# Patient Record
Sex: MALE | Race: BLACK OR AFRICAN AMERICAN | Employment: FULL TIME | URBAN - METROPOLITAN AREA
[De-identification: names, ages, dates, MRNs, and addresses within clinical notes are randomized per-mention and may not be internally consistent; named-entity substitution may affect disease eponyms.]

---

## 2022-01-16 ENCOUNTER — APPOINTMENT (OUTPATIENT)
Dept: GENERAL RADIOLOGY | Age: 47
End: 2022-01-16
Attending: EMERGENCY MEDICINE
Payer: COMMERCIAL

## 2022-01-16 ENCOUNTER — HOSPITAL ENCOUNTER (EMERGENCY)
Age: 47
Discharge: HOME | End: 2022-01-16
Attending: EMERGENCY MEDICINE
Payer: COMMERCIAL

## 2022-01-16 VITALS
RESPIRATION RATE: 18 BRPM | OXYGEN SATURATION: 100 % | WEIGHT: 195 LBS | DIASTOLIC BLOOD PRESSURE: 96 MMHG | HEIGHT: 72 IN | TEMPERATURE: 97.7 F | BODY MASS INDEX: 26.41 KG/M2 | SYSTOLIC BLOOD PRESSURE: 152 MMHG | HEART RATE: 88 BPM

## 2022-01-16 DIAGNOSIS — R06.02 SHORTNESS OF BREATH: Primary | ICD-10-CM

## 2022-01-16 DIAGNOSIS — Z20.822 PERSON UNDER INVESTIGATION FOR COVID-19: ICD-10-CM

## 2022-01-16 DIAGNOSIS — R73.9 HYPERGLYCEMIA: ICD-10-CM

## 2022-01-16 LAB
ALBUMIN SERPL-MCNC: 3.1 G/DL (ref 3.4–5)
ALBUMIN/GLOB SERPL: 0.8 {RATIO} (ref 0.8–1.7)
ALP SERPL-CCNC: 101 U/L (ref 45–117)
ALT SERPL-CCNC: 22 U/L (ref 16–61)
ANION GAP SERPL CALC-SCNC: 12 MMOL/L (ref 3–18)
APTT PPP: 23.8 SEC (ref 23–36.4)
AST SERPL-CCNC: 12 U/L (ref 10–38)
ATRIAL RATE: 96 BPM
BASE DEFICIT BLDV-SCNC: 2.3 MMOL/L
BASOPHILS # BLD: 0 K/UL (ref 0–0.1)
BASOPHILS NFR BLD: 0 % (ref 0–2)
BDY SITE: ABNORMAL
BILIRUB SERPL-MCNC: 0.8 MG/DL (ref 0.2–1)
BUN SERPL-MCNC: 10 MG/DL (ref 7–18)
BUN/CREAT SERPL: 8 (ref 12–20)
CALCIUM SERPL-MCNC: 8.6 MG/DL (ref 8.5–10.1)
CALCULATED P AXIS, ECG09: 66 DEGREES
CALCULATED R AXIS, ECG10: 56 DEGREES
CALCULATED T AXIS, ECG11: 49 DEGREES
CHLORIDE SERPL-SCNC: 93 MMOL/L (ref 100–111)
CO2 SERPL-SCNC: 24 MMOL/L (ref 21–32)
CREAT SERPL-MCNC: 1.31 MG/DL (ref 0.6–1.3)
D DIMER PPP FEU-MCNC: 0.34 UG/ML(FEU)
DIAGNOSIS, 93000: NORMAL
DIFFERENTIAL METHOD BLD: ABNORMAL
EOSINOPHIL # BLD: 0 K/UL (ref 0–0.4)
EOSINOPHIL NFR BLD: 1 % (ref 0–5)
ERYTHROCYTE [DISTWIDTH] IN BLOOD BY AUTOMATED COUNT: 11.6 % (ref 11.6–14.5)
GAS FLOW.O2 O2 DELIVERY SYS: ABNORMAL L/MIN
GLOBULIN SER CALC-MCNC: 3.7 G/DL (ref 2–4)
GLUCOSE BLD STRIP.AUTO-MCNC: 238 MG/DL (ref 70–110)
GLUCOSE SERPL-MCNC: 719 MG/DL (ref 74–99)
HCO3 BLDV-SCNC: 22.6 MMOL/L (ref 23–28)
HCT VFR BLD AUTO: 48.2 % (ref 36–48)
HGB BLD-MCNC: 15.6 G/DL (ref 13–16)
IMM GRANULOCYTES # BLD AUTO: 0 K/UL (ref 0–0.04)
IMM GRANULOCYTES NFR BLD AUTO: 1 % (ref 0–0.5)
INR PPP: 0.9 (ref 0.8–1.2)
LYMPHOCYTES # BLD: 1.1 K/UL (ref 0.9–3.6)
LYMPHOCYTES NFR BLD: 33 % (ref 21–52)
MAGNESIUM SERPL-MCNC: 1.9 MG/DL (ref 1.6–2.6)
MCH RBC QN AUTO: 30.1 PG (ref 24–34)
MCHC RBC AUTO-ENTMCNC: 32.4 G/DL (ref 31–37)
MCV RBC AUTO: 92.9 FL (ref 78–100)
MONOCYTES # BLD: 0.4 K/UL (ref 0.05–1.2)
MONOCYTES NFR BLD: 10 % (ref 3–10)
NEUTS SEG # BLD: 1.9 K/UL (ref 1.8–8)
NEUTS SEG NFR BLD: 55 % (ref 40–73)
NRBC # BLD: 0 K/UL (ref 0–0.01)
NRBC BLD-RTO: 0 PER 100 WBC
P-R INTERVAL, ECG05: 140 MS
PCO2 BLDV: 38.3 MMHG (ref 41–51)
PH BLDV: 7.38 [PH] (ref 7.32–7.42)
PLATELET # BLD AUTO: 234 K/UL (ref 135–420)
PMV BLD AUTO: 9.3 FL (ref 9.2–11.8)
PO2 BLDV: 87 MMHG (ref 25–40)
POTASSIUM SERPL-SCNC: 4.6 MMOL/L (ref 3.5–5.5)
PROT SERPL-MCNC: 6.8 G/DL (ref 6.4–8.2)
PROTHROMBIN TIME: 12 SEC (ref 11.5–15.2)
Q-T INTERVAL, ECG07: 374 MS
QRS DURATION, ECG06: 92 MS
QTC CALCULATION (BEZET), ECG08: 472 MS
RBC # BLD AUTO: 5.19 M/UL (ref 4.35–5.65)
SAO2 % BLDV: 96.5 % (ref 65–88)
SARS-COV-2, COV2: NORMAL
SERVICE CMNT-IMP: ABNORMAL
SODIUM SERPL-SCNC: 129 MMOL/L (ref 136–145)
SPECIMEN TYPE: ABNORMAL
TROPONIN-HIGH SENSITIVITY: 15 NG/L (ref 0–78)
VENTRICULAR RATE, ECG03: 96 BPM
WBC # BLD AUTO: 3.5 K/UL (ref 4.6–13.2)

## 2022-01-16 PROCEDURE — 85379 FIBRIN DEGRADATION QUANT: CPT

## 2022-01-16 PROCEDURE — 80053 COMPREHEN METABOLIC PANEL: CPT

## 2022-01-16 PROCEDURE — 85730 THROMBOPLASTIN TIME PARTIAL: CPT

## 2022-01-16 PROCEDURE — 96360 HYDRATION IV INFUSION INIT: CPT

## 2022-01-16 PROCEDURE — 74011636637 HC RX REV CODE- 636/637: Performed by: EMERGENCY MEDICINE

## 2022-01-16 PROCEDURE — 84484 ASSAY OF TROPONIN QUANT: CPT

## 2022-01-16 PROCEDURE — 85025 COMPLETE CBC W/AUTO DIFF WBC: CPT

## 2022-01-16 PROCEDURE — 99284 EMERGENCY DEPT VISIT MOD MDM: CPT

## 2022-01-16 PROCEDURE — 82803 BLOOD GASES ANY COMBINATION: CPT

## 2022-01-16 PROCEDURE — 71045 X-RAY EXAM CHEST 1 VIEW: CPT

## 2022-01-16 PROCEDURE — 96376 TX/PRO/DX INJ SAME DRUG ADON: CPT

## 2022-01-16 PROCEDURE — 82962 GLUCOSE BLOOD TEST: CPT

## 2022-01-16 PROCEDURE — 74011250636 HC RX REV CODE- 250/636: Performed by: EMERGENCY MEDICINE

## 2022-01-16 PROCEDURE — U0003 INFECTIOUS AGENT DETECTION BY NUCLEIC ACID (DNA OR RNA); SEVERE ACUTE RESPIRATORY SYNDROME CORONAVIRUS 2 (SARS-COV-2) (CORONAVIRUS DISEASE [COVID-19]), AMPLIFIED PROBE TECHNIQUE, MAKING USE OF HIGH THROUGHPUT TECHNOLOGIES AS DESCRIBED BY CMS-2020-01-R: HCPCS

## 2022-01-16 PROCEDURE — 93005 ELECTROCARDIOGRAM TRACING: CPT

## 2022-01-16 PROCEDURE — 96361 HYDRATE IV INFUSION ADD-ON: CPT

## 2022-01-16 PROCEDURE — 83735 ASSAY OF MAGNESIUM: CPT

## 2022-01-16 PROCEDURE — 85610 PROTHROMBIN TIME: CPT

## 2022-01-16 PROCEDURE — 96374 THER/PROPH/DIAG INJ IV PUSH: CPT

## 2022-01-16 RX ORDER — METFORMIN HYDROCHLORIDE 1000 MG/1
1000 TABLET ORAL 2 TIMES DAILY WITH MEALS
Qty: 60 TABLET | Refills: 0 | Status: SHIPPED | OUTPATIENT
Start: 2022-01-16

## 2022-01-16 RX ORDER — BENZONATATE 100 MG/1
100 CAPSULE ORAL
Qty: 30 CAPSULE | Refills: 0 | Status: SHIPPED | OUTPATIENT
Start: 2022-01-16 | End: 2022-01-26

## 2022-01-16 RX ORDER — BLOOD SUGAR DIAGNOSTIC
STRIP MISCELLANEOUS
Qty: 100 STRIP | Refills: 0 | Status: SHIPPED | OUTPATIENT
Start: 2022-01-16 | End: 2022-01-16 | Stop reason: SDUPTHER

## 2022-01-16 RX ORDER — BLOOD SUGAR DIAGNOSTIC
STRIP MISCELLANEOUS
Qty: 100 STRIP | Refills: 0 | Status: SHIPPED | OUTPATIENT
Start: 2022-01-16

## 2022-01-16 RX ORDER — METFORMIN HYDROCHLORIDE 1000 MG/1
1000 TABLET ORAL 2 TIMES DAILY WITH MEALS
Qty: 60 TABLET | Refills: 0 | Status: SHIPPED | OUTPATIENT
Start: 2022-01-16 | End: 2022-01-16 | Stop reason: SDUPTHER

## 2022-01-16 RX ORDER — BENZONATATE 100 MG/1
100 CAPSULE ORAL
Qty: 30 CAPSULE | Refills: 0 | Status: SHIPPED | OUTPATIENT
Start: 2022-01-16 | End: 2022-01-16 | Stop reason: SDUPTHER

## 2022-01-16 RX ADMIN — INSULIN HUMAN 15 UNITS: 100 INJECTION, SOLUTION PARENTERAL at 14:03

## 2022-01-16 RX ADMIN — SODIUM CHLORIDE 1000 ML: 9 INJECTION, SOLUTION INTRAVENOUS at 13:11

## 2022-01-16 RX ADMIN — INSULIN HUMAN 15 UNITS: 100 INJECTION, SOLUTION PARENTERAL at 14:54

## 2022-01-16 NOTE — Clinical Note
[facility] [place]  [facility]  [address]  [facility] [address] [phone]    Work/School Note    Date: 1/16/2022    To Whom It May concern:      Ino Leos was seen and treated today in the emergency room by the following provider(s):  Attending Provider: Mati Bal MD.      Ino Leos is excused from work/school on 01/16/22. He is clear to return to work/school on 01/17/22.         Sincerely,          Imelda Banerjee MD

## 2022-01-16 NOTE — Clinical Note
[facility] [place]  [facility] EMERGENCY DEPT  [address]  [facility] NEWS [address]  [address] [phone]    Work/School Note    Date: 1/16/2022     To Whom It May concern:    Ayaka Mcdaniel was evaluated by the following provider(s):  Attending Provider: Leopold Searle, MD.   [address] virus is suspected. Per the CDC guidelines we recommend home isolation until the following conditions are all met:    1. At least five days have passed since symptoms first appeared and/or had a close exposure,   2. After home isolation for five days, wearing a mask around others for the next five days,  3. At least 24 have passed since last fever without the use of fever-reducing medications and  4.  Symptoms (eg cough, shortness of breath) have improved      Sincerely,          Imelda Gar MD

## 2022-01-16 NOTE — ED PROVIDER NOTES
EMERGENCY DEPARTMENT HISTORY AND PHYSICAL EXAM    Date: 1/16/2022  Patient Name: Jessie Magaña    History of Presenting Illness     Chief Complaint   Patient presents with    Cough       History Provided By: Patient     History Vishnu Muñoz):   12:19 PM  Jessie Magaña is a 42 y.o. male with no contributory PMHX who presents to the emergency department C/O dyspnea onset 4 days. Associated sxs include cough, fatigue. Pt denies any other sxs or complaints. Patient is fully vaccinated for COVID with 2 doses of Moderna. He visited his friend in [place] last week. He suspects he got COVID from his friend. He has been self quarantining at home. Chief Complaint: dyspnea  Onset: 4 days   Timing:  acute  Context: Exposure to COVID brought symptoms on, symptoms have rapidly worsened since onset  Location: lungs  Quality: Aching  Severity: Moderate  Modifying Factors: nothing makes it better, or worse. Associated Symptoms: cough, fatigue    PCP: None    Past History         Past Medical History:  History reviewed. No pertinent past medical history. Past Surgical History:  History reviewed. No pertinent surgical history. Family History:  History reviewed. No pertinent family history. Reviewed and non-contributory    Social History:  Social History     Tobacco Use    Smoking status: Not on file    Smokeless tobacco: Not on file   Substance Use Topics    Alcohol use: Not on file    Drug use: Not on file       Allergies:  No Known Allergies      Review of Systems      Review of Systems   Constitutional: Positive for fatigue. Negative for chills and fever. HENT: Negative for rhinorrhea and sore throat. Eyes: Negative for pain and visual disturbance. Respiratory: Positive for cough and shortness of breath. Negative for chest tightness and wheezing. Cardiovascular: Negative for chest pain and palpitations. Gastrointestinal: Negative for abdominal pain, diarrhea, nausea and vomiting.    Musculoskeletal: Negative for arthralgias and myalgias. Skin: Negative for rash and wound. Neurological: Negative for speech difficulty, light-headedness and headaches. Psychiatric/Behavioral: Negative for agitation and confusion. All other systems reviewed and are negative. Physical Exam     Vitals:    01/16/22 1208   BP: (!) 145/71   Pulse: 97   Resp: 18   Temp: 97.7 °F (36.5 °C)   SpO2: 100%   Weight: 88.5 kg (195 lb)   Height: 6' (1.829 m)       Physical Exam  Vitals and nursing note reviewed. Constitutional:       General: He is not in acute distress. Appearance: Normal appearance. He is normal weight. He is not ill-appearing. HENT:      Head: Normocephalic and atraumatic. Nose: Nose normal. No rhinorrhea. Mouth/Throat:      Mouth: Mucous membranes are moist.      Pharynx: No oropharyngeal exudate or posterior oropharyngeal erythema. Eyes:      Extraocular Movements: Extraocular movements intact. Conjunctiva/sclera: Conjunctivae normal.      Pupils: Pupils are equal, round, and reactive to light. Cardiovascular:      Rate and Rhythm: Normal rate and regular rhythm. Heart sounds: No murmur heard. No friction rub. No gallop. Pulmonary:      Effort: Pulmonary effort is normal. No respiratory distress. Breath sounds: Normal breath sounds. No wheezing, rhonchi or rales. Abdominal:      General: Bowel sounds are normal.      Palpations: Abdomen is soft. Tenderness: There is no abdominal tenderness. There is no guarding or rebound. Musculoskeletal:         General: No swelling, tenderness or deformity. Normal range of motion. Cervical back: Normal range of motion and neck supple. No rigidity. Lymphadenopathy:      Cervical: No cervical adenopathy. Skin:     General: Skin is warm and dry. Findings: No rash. Neurological:      General: No focal deficit present. Mental Status: He is alert and oriented to person, place, and time.    Psychiatric: Mood and Affect: Mood normal.         Behavior: Behavior normal.         Diagnostic Study Results     Labs -     Recent Results (from the past 12 hour(s))   CBC WITH AUTOMATED DIFF    Collection Time: 01/16/22 12:30 PM   Result Value Ref Range    WBC 3.5 (L) 4.6 - 13.2 K/uL    RBC 5.19 4.35 - 5.65 M/uL    HGB 15.6 13.0 - 16.0 g/dL    HCT 48.2 (H) 36.0 - 48.0 %    MCV 92.9 78.0 - 100.0 FL    MCH 30.1 24.0 - 34.0 PG    MCHC 32.4 31.0 - 37.0 g/dL    RDW 11.6 11.6 - 14.5 %    PLATELET 498 965 - 840 K/uL    MPV 9.3 9.2 - 11.8 FL    NRBC 0.0 0  WBC    ABSOLUTE NRBC 0.00 0.00 - 0.01 K/uL    NEUTROPHILS 55 40 - 73 %    LYMPHOCYTES 33 21 - 52 %    MONOCYTES 10 3 - 10 %    EOSINOPHILS 1 0 - 5 %    BASOPHILS 0 0 - 2 %    IMMATURE GRANULOCYTES 1 (H) 0.0 - 0.5 %    ABS. NEUTROPHILS 1.9 1.8 - 8.0 K/UL    ABS. LYMPHOCYTES 1.1 0.9 - 3.6 K/UL    ABS. MONOCYTES 0.4 0.05 - 1.2 K/UL    ABS. EOSINOPHILS 0.0 0.0 - 0.4 K/UL    ABS. BASOPHILS 0.0 0.0 - 0.1 K/UL    ABS. IMM.  GRANS. 0.0 0.00 - 0.04 K/UL    DF AUTOMATED     D DIMER    Collection Time: 01/16/22 12:30 PM   Result Value Ref Range    D DIMER 0.34 <0.46 ug/ml(FEU)   PROTHROMBIN TIME + INR    Collection Time: 01/16/22 12:30 PM   Result Value Ref Range    Prothrombin time 12.0 11.5 - 15.2 sec    INR 0.9 0.8 - 1.2     PTT    Collection Time: 01/16/22 12:30 PM   Result Value Ref Range    aPTT 23.8 23.0 - 36.4 SEC   EKG, 12 LEAD, INITIAL    Collection Time: 01/16/22 12:38 PM   Result Value Ref Range    Ventricular Rate 96 BPM    Atrial Rate 96 BPM    P-R Interval 140 ms    QRS Duration 92 ms    Q-T Interval 374 ms    QTC Calculation (Bezet) 472 ms    Calculated P Axis 66 degrees    Calculated R Axis 56 degrees    Calculated T Axis 49 degrees    Diagnosis       Normal sinus rhythm  Possible Left atrial enlargement  Septal infarct , age undetermined  Abnormal ECG  No previous ECGs available     SARS-COV-2    Collection Time: 01/16/22 12:45 PM   Result Value Ref Range    SARS-CoV-2 Please find results under separate order     TROPONIN-HIGH SENSITIVITY    Collection Time: 01/16/22  1:15 PM   Result Value Ref Range    Troponin-High Sensitivity 15 0 - 78 ng/L   MAGNESIUM    Collection Time: 01/16/22  1:15 PM   Result Value Ref Range    Magnesium 1.9 1.6 - 2.6 mg/dL   METABOLIC PANEL, COMPREHENSIVE    Collection Time: 01/16/22  1:15 PM   Result Value Ref Range    Sodium 129 (L) 136 - 145 mmol/L    Potassium 4.6 3.5 - 5.5 mmol/L    Chloride 93 (L) 100 - 111 mmol/L    CO2 24 21 - 32 mmol/L    Anion gap 12 3.0 - 18 mmol/L    Glucose 719 (HH) 74 - 99 mg/dL    BUN 10 7.0 - 18 MG/DL    Creatinine 1.31 (H) 0.6 - 1.3 MG/DL    BUN/Creatinine ratio 8 (L) 12 - 20      GFR est AA >60 >60 ml/min/1.73m2    GFR est non-AA 59 (L) >60 ml/min/1.73m2    Calcium 8.6 8.5 - 10.1 MG/DL    Bilirubin, total 0.8 0.2 - 1.0 MG/DL    ALT (SGPT) 22 16 - 61 U/L    AST (SGOT) 12 10 - 38 U/L    Alk. phosphatase 101 45 - 117 U/L    Protein, total 6.8 6.4 - 8.2 g/dL    Albumin 3.1 (L) 3.4 - 5.0 g/dL    Globulin 3.7 2.0 - 4.0 g/dL    A-G Ratio 0.8 0.8 - 1.7     POC VENOUS BLOOD GAS    Collection Time: 01/16/22  2:35 PM   Result Value Ref Range    Device: ROOM AIR      pH, venous (POC) 7.38 7.32 - 7.42      pCO2, venous (POC) 38.3 (L) 41 - 51 MMHG    pO2, venous (POC) 87 (H) 25 - 40 mmHg    HCO3, venous (POC) 22.6 (L) 23.0 - 28.0 MMOL/L    sO2, venous (POC) 96.5 (H) 65 - 88 %    Base deficit, venous (POC) 2.3 mmol/L    Site LEFT RADIAL      Specimen type (POC) VENOUS BLOOD      Performed by Mandeep Garcias    GLUCOSE, POC    Collection Time: 01/16/22  3:25 PM   Result Value Ref Range    Glucose (POC) 238 (H) 70 - 110 mg/dL       Radiologic Studies -   XR CHEST PORT   Final Result      No acute cardiopulmonary process. CT Results  (Last 48 hours)    None        CXR Results  (Last 48 hours)               01/16/22 1309  XR CHEST PORT Final result    Impression:      No acute cardiopulmonary process.        Narrative:  CLINICAL HISTORY:  As above. COMPARISONS:  None. TECHNIQUE:  single frontal view of the chest       ------------------------------------------       FINDINGS:       Lungs:  No consolidation or pleural fluid. Mediastinum: Unremarkable. Bones: No evidence of fracture or suspicious bone lesion.           ------------------------------------------             Medications given in the ED-  Medications   sodium chloride 0.9 % bolus infusion 1,000 mL (0 mL IntraVENous IV Completed 1/16/22 1519)   insulin regular (NOVOLIN R, HUMULIN R) injection 15 Units (15 Units IntraVENous Given 1/16/22 1403)   insulin regular (NOVOLIN R, HUMULIN R) injection 15 Units (15 Units IntraVENous Given 1/16/22 1454)         Procedures     Procedures    ED Course     I am the first provider for this patient. I reviewed the vital signs, available nursing notes, past medical history, past surgical history, family history and social history. Records Reviewed: Nursing Notes    Pulse Oximetry Analysis - 100% on RA     Cardiac Monitor:  Rate: 97 bpm  Rhythm: sinus rhythm    EKG interpretation: (Preliminary)  Rhythm: NSR. Rate: 96 bpm; no STEMI  EKG read by Fausto Winter MD at 12:38 PM    12:19 PM Initial assessment performed. The patients presenting problems have been discussed, and they are in agreement with the care plan formulated and outlined with them. I have encouraged them to ask questions as they arise throughout their visit. ED Course as of 01/16/22 1544   Sun Jan 16, 2022   1400 Patient states that he does have a history of diabetes but he is not currently taking his metformin. He does not like the side effects. [JM]   1439 Repeat blood sugar still elevated, will redose insulin. [JM]   1538 Sugar below 300.  [JM]      ED Course User Index  [JM] Luis Rico MD           Medical Decision Making     Provider Notes (Medical Decision Making):   DDX: URI, pneumonia, coronavirus, hyperglycemia    Discussion:  42 y.o. male presented with concerns for coronavirus infection, however his blood sugar was over 700. Patient states he is not taking his metformin at home. He did have a slight anion gap but did not show any acidosis on ABG. Patient's sugar was corrected rapidly in the ED to below 300. He will be restarted on his metformin at home and his test strips were refilled. He should follow-up with his primary care doctor or 1 of ours. Patient understands and agrees with this plan. Diagnosis and Disposition     DISCHARGE NOTE:  3:39 PM   Papo Franz  results have been reviewed with him. He has been counseled regarding his diagnosis, treatment, and plan. He verbally conveys understanding and agreement of the signs, symptoms, diagnosis, treatment and prognosis and additionally agrees to follow up as discussed. He also agrees with the care-plan and conveys that all of his questions have been answered. I have also provided discharge instructions for him that include: educational information regarding their diagnosis and treatment, and list of reasons why they would want to return to the ED prior to their follow-up appointment, should his condition change. He has been provided with education for proper emergency department utilization. CLINICAL IMPRESSION:    1. Shortness of breath    2. Person under investigation for COVID-19    3. Hyperglycemia        PLAN:  1. D/C Home  2. Current Discharge Medication List      START taking these medications    Details   metFORMIN (GLUCOPHAGE) 1,000 mg tablet Take 1 Tablet by mouth two (2) times daily (with meals). Qty: 60 Tablet, Refills: 0  Start date: 1/16/2022      glucose blood VI test strips (OneTouch Ultra Test) strip Check sugars daily with meals and before bed. Qty: 100 Strip, Refills: 0  Start date: 1/16/2022      benzonatate (Tessalon Perles) 100 mg capsule Take 1 Capsule by mouth three (3) times daily as needed for Cough for up to 10 days.   Qty: 30 Capsule, Refills: 0  Start date: 1/16/2022, End date: 1/26/2022           3. Follow-up Information     Follow up With Specialties Details Why Contact Info    [facility], [facility]. FAMILY MEDICINE  Schedule an appointment as soon as possible for a visit  As soon as possible, For follow up from Emergency Department visit. [facility] [address]  [address] [facility]  Schedule an appointment as soon as possible for a visit  As soon as possible, For follow up from Emergency Department visit. [facility]   As soon as possible, For follow up from Emergency Department visit. [address] [facility] [address] [facility]  Schedule an appointment as soon as possible for a visit  As soon as possible, For follow up from Emergency Department visit. [address] [facility]    THE [facility] EMERGENCY DEPT Emergency Medicine  As needed; If symptoms worsen [address] [address] [address]     Please note that this dictation was completed with [employer], the computer voice recognition software. Quite often unanticipated grammatical, syntax, homophones, and other interpretive errors are inadvertently transcribed by the computer software. Please disregard these errors. Please excuse any errors that have escaped final proofreading.     Alvina Moreno MD

## 2022-01-17 ENCOUNTER — PATIENT OUTREACH (OUTPATIENT)
Dept: CASE MANAGEMENT | Age: 47
End: 2022-01-17

## 2022-01-17 LAB — SARS-COV-2, NAA: DETECTED

## 2022-01-17 RX ORDER — BLOOD SUGAR DIAGNOSTIC
STRIP MISCELLANEOUS
Qty: 100 STRIP | Refills: 0 | Status: SHIPPED | OUTPATIENT
Start: 2022-01-17

## 2022-01-17 NOTE — PROGRESS NOTES
Ambulatory Care Coordination ED COVID Follow up Call    Challenges to be reviewed by the provider   Additional needs identified to be addressed with provider no  none           Encounter was not routed to provider for escalation. Method of communication with provider : none    Discussed COVID-19 related testing which was pending at this time. Test results were pending. Patient informed of results, if available? n/a. Current Symptoms: fever, fatigue and cough    Reviewed New or Changed Meds: yes    Do you have what you need at home?  Durable Medical Equipment ordered at discharge: [facility]/Outpatient orders at discharge: none    Pulse oximeter? no Discussed and confirmed pulse oximeter discharge instructions and when to notify provider or seek emergency care. Patient education provided: Reviewed appropriate site of care based on symptoms and resources available to patient including: PCP. Follow up appointment recommended: yes. If no appointment scheduled, scheduling offered: Patient states he will call PCP to schedule. ACM provided patient with a list of [facility] providers via email. .  No future appointments. Interventions: Obtained and reviewed discharge summary and/or continuity of care documents and Assessment and support for treatment adherence and medication management-. Reviewed discharge instructions, medical action plan and red flags with patient who verbalized understanding. Provided contact information for future needs. Plan for follow-up call in 7-10 days based on severity of symptoms and risk factors.   Plan for next call: self management-.     Kika Perkins

## 2022-01-31 ENCOUNTER — PATIENT OUTREACH (OUTPATIENT)
Dept: CASE MANAGEMENT | Age: 47
End: 2022-01-31

## 2022-01-31 NOTE — PROGRESS NOTES
Patient resolved from [address] Transitions episode on 1/31/22. Discussed COVID-19 related testing which was available at this time. Test results were positive. Patient informed of results, if available? yes     Patient/family has been provided the following resources and education related to COVID-19:                         Signs, symptoms and red flags related to COVID-19            [facility] exposure and quarantine guidelines            [facility] exposure contact - [phone]            Contact for their local Department of Health                 Patient currently reports that the following symptoms have improved:  no new symptoms and no worsening symptoms. No further outreach scheduled with this CTN/ACM/LPN/HC/ MA. Episode of Care resolved. Patient has this CTN/ACM/LPN/HC/MA contact information if future needs arise.

## 2023-05-25 ENCOUNTER — HOSPITAL ENCOUNTER (EMERGENCY)
Facility: HOSPITAL | Age: 48
Discharge: HOME | End: 2023-05-26
Attending: EMERGENCY MEDICINE
Payer: COMMERCIAL

## 2023-05-25 VITALS
BODY MASS INDEX: 23.7 KG/M2 | OXYGEN SATURATION: 100 % | TEMPERATURE: 97.4 F | HEART RATE: 79 BPM | DIASTOLIC BLOOD PRESSURE: 73 MMHG | SYSTOLIC BLOOD PRESSURE: 119 MMHG | RESPIRATION RATE: 18 BRPM | WEIGHT: 175 LBS | HEIGHT: 72 IN

## 2023-05-25 DIAGNOSIS — E11.65 TYPE 2 DIABETES MELLITUS WITH HYPERGLYCEMIA, WITHOUT LONG-TERM CURRENT USE OF INSULIN (HCC): Primary | ICD-10-CM

## 2023-05-25 LAB
ALBUMIN SERPL-MCNC: 3.8 G/DL (ref 3.4–5)
ALBUMIN/GLOB SERPL: 1.1 (ref 0.8–1.7)
ALP SERPL-CCNC: 75 U/L (ref 45–117)
ALT SERPL-CCNC: 16 U/L (ref 16–61)
ANION GAP SERPL CALC-SCNC: 6 MMOL/L (ref 3–18)
APPEARANCE UR: CLEAR
AST SERPL-CCNC: 8 U/L (ref 10–38)
BASOPHILS # BLD: 0 K/UL (ref 0–0.1)
BASOPHILS NFR BLD: 0 % (ref 0–2)
BILIRUB SERPL-MCNC: 0.8 MG/DL (ref 0.2–1)
BILIRUB UR QL: NEGATIVE
BUN SERPL-MCNC: 13 MG/DL (ref 7–18)
BUN/CREAT SERPL: 11 (ref 12–20)
CALCIUM SERPL-MCNC: 9.1 MG/DL (ref 8.5–10.1)
CHLORIDE SERPL-SCNC: 97 MMOL/L (ref 100–111)
CO2 SERPL-SCNC: 29 MMOL/L (ref 21–32)
COLOR UR: YELLOW
CREAT SERPL-MCNC: 1.16 MG/DL (ref 0.6–1.3)
DIFFERENTIAL METHOD BLD: ABNORMAL
EKG ATRIAL RATE: 85 BPM
EKG DIAGNOSIS: NORMAL
EKG P AXIS: 51 DEGREES
EKG P-R INTERVAL: 148 MS
EKG Q-T INTERVAL: 398 MS
EKG QRS DURATION: 102 MS
EKG QTC CALCULATION (BAZETT): 473 MS
EKG R AXIS: 93 DEGREES
EKG T AXIS: -3 DEGREES
EKG VENTRICULAR RATE: 85 BPM
EOSINOPHIL # BLD: 0.1 K/UL (ref 0–0.4)
EOSINOPHIL NFR BLD: 2 % (ref 0–5)
ERYTHROCYTE [DISTWIDTH] IN BLOOD BY AUTOMATED COUNT: 11.7 % (ref 11.6–14.5)
GLOBULIN SER CALC-MCNC: 3.6 G/DL (ref 2–4)
GLUCOSE BLD STRIP.AUTO-MCNC: 439 MG/DL (ref 70–110)
GLUCOSE BLD STRIP.AUTO-MCNC: 446 MG/DL (ref 70–110)
GLUCOSE SERPL-MCNC: 561 MG/DL (ref 74–99)
GLUCOSE UR STRIP.AUTO-MCNC: >1000 MG/DL
HCT VFR BLD AUTO: 40.7 % (ref 36–48)
HGB BLD-MCNC: 13.6 G/DL (ref 13–16)
HGB UR QL STRIP: NEGATIVE
IMM GRANULOCYTES # BLD AUTO: 0 K/UL (ref 0–0.04)
IMM GRANULOCYTES NFR BLD AUTO: 0 % (ref 0–0.5)
KETONES UR QL STRIP.AUTO: 15 MG/DL
LEUKOCYTE ESTERASE UR QL STRIP.AUTO: NEGATIVE
LYMPHOCYTES # BLD: 2.1 K/UL (ref 0.9–3.6)
LYMPHOCYTES NFR BLD: 36 % (ref 21–52)
MCH RBC QN AUTO: 31.6 PG (ref 24–34)
MCHC RBC AUTO-ENTMCNC: 33.4 G/DL (ref 31–37)
MCV RBC AUTO: 94.7 FL (ref 78–100)
MONOCYTES # BLD: 0.4 K/UL (ref 0.05–1.2)
MONOCYTES NFR BLD: 7 % (ref 3–10)
NEUTS SEG # BLD: 3.2 K/UL (ref 1.8–8)
NEUTS SEG NFR BLD: 56 % (ref 40–73)
NITRITE UR QL STRIP.AUTO: NEGATIVE
NRBC # BLD: 0 K/UL (ref 0–0.01)
NRBC BLD-RTO: 0 PER 100 WBC
NT PRO BNP: 13 PG/ML (ref 0–450)
PH UR STRIP: 6.5 (ref 5–8)
PLATELET # BLD AUTO: 290 K/UL (ref 135–420)
PMV BLD AUTO: 9.4 FL (ref 9.2–11.8)
POTASSIUM SERPL-SCNC: 4.2 MMOL/L (ref 3.5–5.5)
PROT SERPL-MCNC: 7.4 G/DL (ref 6.4–8.2)
PROT UR STRIP-MCNC: NEGATIVE MG/DL
RBC # BLD AUTO: 4.3 M/UL (ref 4.35–5.65)
SODIUM SERPL-SCNC: 132 MMOL/L (ref 136–145)
SP GR UR REFRACTOMETRY: >1.03 (ref 1–1.03)
UROBILINOGEN UR QL STRIP.AUTO: 1 EU/DL (ref 0.2–1)
WBC # BLD AUTO: 5.8 K/UL (ref 4.6–13.2)

## 2023-05-25 PROCEDURE — 93005 ELECTROCARDIOGRAM TRACING: CPT | Performed by: EMERGENCY MEDICINE

## 2023-05-25 PROCEDURE — 80053 COMPREHEN METABOLIC PANEL: CPT

## 2023-05-25 PROCEDURE — 81003 URINALYSIS AUTO W/O SCOPE: CPT

## 2023-05-25 PROCEDURE — 96374 THER/PROPH/DIAG INJ IV PUSH: CPT

## 2023-05-25 PROCEDURE — 96361 HYDRATE IV INFUSION ADD-ON: CPT

## 2023-05-25 PROCEDURE — 6370000000 HC RX 637 (ALT 250 FOR IP): Performed by: EMERGENCY MEDICINE

## 2023-05-25 PROCEDURE — 99284 EMERGENCY DEPT VISIT MOD MDM: CPT

## 2023-05-25 PROCEDURE — 2580000003 HC RX 258: Performed by: EMERGENCY MEDICINE

## 2023-05-25 PROCEDURE — 83880 ASSAY OF NATRIURETIC PEPTIDE: CPT

## 2023-05-25 PROCEDURE — 96376 TX/PRO/DX INJ SAME DRUG ADON: CPT

## 2023-05-25 PROCEDURE — 85025 COMPLETE CBC W/AUTO DIFF WBC: CPT

## 2023-05-25 PROCEDURE — 82962 GLUCOSE BLOOD TEST: CPT

## 2023-05-25 RX ORDER — 0.9 % SODIUM CHLORIDE 0.9 %
1000 INTRAVENOUS SOLUTION INTRAVENOUS ONCE
Status: COMPLETED | OUTPATIENT
Start: 2023-05-25 | End: 2023-05-26

## 2023-05-25 RX ADMIN — SODIUM CHLORIDE 1000 ML: 9 INJECTION, SOLUTION INTRAVENOUS at 22:39

## 2023-05-25 RX ADMIN — INSULIN HUMAN 10 UNITS: 100 INJECTION, SOLUTION PARENTERAL at 22:40

## 2023-05-25 ASSESSMENT — PAIN - FUNCTIONAL ASSESSMENT: PAIN_FUNCTIONAL_ASSESSMENT: NONE - DENIES PAIN

## 2023-05-26 LAB — GLUCOSE BLD STRIP.AUTO-MCNC: 259 MG/DL (ref 70–110)

## 2023-05-26 PROCEDURE — 2580000003 HC RX 258: Performed by: EMERGENCY MEDICINE

## 2023-05-26 PROCEDURE — 82962 GLUCOSE BLOOD TEST: CPT

## 2023-05-26 PROCEDURE — 6370000000 HC RX 637 (ALT 250 FOR IP): Performed by: EMERGENCY MEDICINE

## 2023-05-26 RX ORDER — 0.9 % SODIUM CHLORIDE 0.9 %
1000 INTRAVENOUS SOLUTION INTRAVENOUS ONCE
Status: COMPLETED | OUTPATIENT
Start: 2023-05-26 | End: 2023-05-26

## 2023-05-26 RX ADMIN — INSULIN HUMAN 10 UNITS: 100 INJECTION, SOLUTION PARENTERAL at 00:08

## 2023-05-26 RX ADMIN — SODIUM CHLORIDE 1000 ML: 900 INJECTION, SOLUTION INTRAVENOUS at 01:19

## 2023-05-26 NOTE — ED TRIAGE NOTES
Pt arrives to ed reporting high blood sugar (bs 406) that was noticed by dr. Rafiq Blue takes metformin. However states he does not take it daily.  Bs 446 in triage after meal.

## 2023-05-26 NOTE — ED NOTES
Chief Complaint   Patient presents with    Hyperglycemia   No past medical history on file. Pt in gown, in bed locked and in lowest position, call light in reach, family at bedside, warm blanket provided, monitoring BP, HR, and O2. Lined and Labs drawn, urine collected and all sent to lab.      Rosalino Sullivan RN  05/25/23 0432

## 2023-05-26 NOTE — ED PROVIDER NOTES
[facility] EMERGENCY DEPT  EMERGENCY DEPARTMENT ENCOUNTER    Patient Name: Guera Luciano  MRN: [id number]  YOB: 1975  Provider: Mert Lopez MD  PCP: SADIA Mckee NP   Time/Date of evaluation: 9:54 PM EDT on 5/25/23    History of Presenting Illness     Chief Complaint   Patient presents with    Hyperglycemia       History Provided by: Patient and Patient's Wife   History is limited by: Nothing    HISTORY Meera Infante):   Guera Luciano is a 41 y.o. male with a PMHX of diabetes  who presents to the emergency department (room 1) by POV C/O hyperglycemia onset today. Associated sxs include elevated blood glucose at home (406). Pt denies any other sxs or complaints. Patient states he is using metformin, but does not take daily. He typically takes it he feels that he needs it. Glucose is 446 in triage. Nursing Notes were all reviewed and agreed with or any disagreements were addressed in the HPI. Past History     PAST MEDICAL HISTORY:  No past medical history on file. PAST SURGICAL HISTORY:  No past surgical history on file. FAMILY HISTORY:  No family history on file.     SOCIAL HISTORY:       MEDICATIONS:  Current Facility-Administered Medications   Medication Dose Route Frequency Provider Last Rate Last Admin    0.9 % sodium chloride bolus  1,000 mL IntraVENous Once Prakash Yeung MD         Current Outpatient Medications   Medication Sig Dispense Refill    metFORMIN (GLUCOPHAGE) 1000 MG tablet Take 1,000 mg by mouth 2 times daily (with meals)         ALLERGIES:  No Known Allergies    SOCIAL DETERMINANTS OF HEALTH:  Social Determinants of Health     Tobacco Use: Not on file   Alcohol Use: Not on file   Financial Resource Strain: Not on file   Food Insecurity: Not on file   Transportation Needs: Not on file   Physical Activity: Not on file   Stress: Not on file   Social Connections: Not on file   Intimate Partner Violence: Not on file   Depression: Not on file   Housing Stability:

## 2023-05-26 NOTE — DISCHARGE INSTRUCTIONS
Primary care doctor. Return to ED for worsening symptoms or for other concerns. Discussed with your endocrinologist or your primary care doctor possible changes in diabetic medications.

## 2023-05-31 LAB
EKG ATRIAL RATE: 85 BPM
EKG DIAGNOSIS: NORMAL
EKG P AXIS: 51 DEGREES
EKG P-R INTERVAL: 148 MS
EKG Q-T INTERVAL: 398 MS
EKG QRS DURATION: 102 MS
EKG QTC CALCULATION (BAZETT): 473 MS
EKG R AXIS: 93 DEGREES
EKG T AXIS: -3 DEGREES
EKG VENTRICULAR RATE: 85 BPM

## 2024-04-03 ENCOUNTER — OFFICE VISIT (OUTPATIENT)
Age: 49
End: 2024-04-03
Payer: COMMERCIAL

## 2024-04-03 VITALS
WEIGHT: 200 LBS | SYSTOLIC BLOOD PRESSURE: 110 MMHG | HEIGHT: 72 IN | DIASTOLIC BLOOD PRESSURE: 76 MMHG | BODY MASS INDEX: 27.09 KG/M2 | HEART RATE: 98 BPM | OXYGEN SATURATION: 98 %

## 2024-04-03 DIAGNOSIS — I83.813 VARICOSE VEINS OF BOTH LOWER EXTREMITIES WITH PAIN: Primary | ICD-10-CM

## 2024-04-03 PROCEDURE — 99203 OFFICE O/P NEW LOW 30 MIN: CPT | Performed by: SURGERY

## 2024-04-03 RX ORDER — INSULIN GLARGINE-YFGN 100 [IU]/ML
INJECTION, SOLUTION SUBCUTANEOUS
COMMUNITY
Start: 2024-02-21

## 2024-04-03 NOTE — PATIENT INSTRUCTIONS
In preparation for your venous ultrasound (reflux study), please do the following:   Do not wear compression stockings for 4 days prior to the ultrasound  Do not consume caffeine, including coffee, tea, soda or other caffeine containing soft drink, for 24 hours before the ultrasound  Please make sure that you are well hydrated when you present for the ultrasound.     This is a complex and detailed study and will require about 1.5 hours of your time. You need to be able to lie on a bed for the majority of that time.

## 2024-04-03 NOTE — PROGRESS NOTES
Carlos Jiménez    Chief Complaint   Patient presents with    Varicose Veins     Self referred for pain.       History and Physical    Carlos Jiménez is a 48 y.o. male with PMH significant for IDDM.     he presents today for varicose veins and pains    he describes bilateral foot numbness, large varicose veins in both legs which become painful. Varices are tender to palpation and sensitive to touch and burning. Symptoms worse by the end of the day and with more activity.     In addition, he describes pain in his back, lateral hips as well as abdomen. Also endorses shooting pain in his arms at times as well as his chest.     Has been evaluated [facility]  - s/p hip injections bilaterally - didn't relieve symptoms  - was told that he has arthritis in his spine    Has not done physical therapy for his back.     He works as a , 50/50 standing and sitting.     Onset of symptoms was about a year ago and have been worsening.    Associated symptoms:   [x] Edema - has noticed bilateral ankle edema  [x] varicose veins  [x] heaviness/aching  [x] fatigue  [x] Pain  [] H/o or current ulcer(s)    Aggravating factors include standing. Relieving factors include none.     Patient   [x] has   [] has not   been wearing compression stockings. Has been wearing compression stockings for years. He thinks they do not help his symptoms much.       Relevant history:   [] female gender  [] Family history of venous disease: maternal grandmother, mother  [] history of pregnancy: n/a  [] history of DVT/PE  [] history of vein procedure      Pertinent edema history:  [] CHF/pulmonary HTN  [] SONDRA/snoring  [] CKD  [] Pulmonary disease  [] Obesity   [] Activity level    Smoking status: cigars intermittently 2x/week, x 5-10 years.                               No pertinent imaging studies to review    The following labs were reviewed:   Lab Results   Component Value Date     (L) 05/25/2023

## 2024-04-23 ENCOUNTER — HOSPITAL ENCOUNTER (OUTPATIENT)
Facility: HOSPITAL | Age: 49
Setting detail: RECURRING SERIES
Discharge: HOME | End: 2024-04-26
Payer: COMMERCIAL

## 2024-04-23 PROCEDURE — 97162 PT EVAL MOD COMPLEX 30 MIN: CPT

## 2024-04-23 NOTE — PROGRESS NOTES
[facility]  [address] [place] [address]  Ph [phone]  Fx [phone]    Plan of Care/ Statement of Necessity for Physical Therapy Services      Patient name: Carlos Jiménez Start of Care: 2024   Referral source: Rudolph Escobedo MD : 1975    Medical Diagnosis: Other low back pain [M54.59]   Onset Date:23    Treatment Diagnosis: M54.59  OTHER LOWER BACK PAIN     Prior Hospitalization: see medical history Provider#: [id number]   Medications: Verified on Patient summary List   Comorbidities: Dm type 2, vascular insufficiency   Prior Level of Function:  functionally independent, no AD, active  lifestyle       The Plan of Care and following information is based on the information from the initial evaluation.  Assessment/ key information: 47 yo male who [facility] to [facility] with c/o Low back pain. Patient reported that his legs and hips started hurting about a year ago.  Pt initially had pain in his hips which has now progressed to legs.  He states that his pain shoots across his groin abdomen and down his legs. Pt reported that when pain originally started pt tried a steroid pack with mild improvements.  Pt reports that pain is currently worsening in LBP and right side. Patient reports decreased extended standing, difficulty driving, and difficulty with walking. Patient demonstrates decreased ROM, decreased strength, impaired posture, impaired gait mechancis, pain and decreased functional mobility tolerance.     Patient will continue to benefit from skilled PT services to modify and progress therapeutic interventions, address functional mobility deficits, address ROM deficits, address strength deficits, analyze and address soft tissue restrictions, analyze and cue movement patterns, analyze and modify body mechanics/ergonomics, assess and modify postural abnormalities, address imbalance/dizziness, and instruct in home and community integration to attain remaining 
Evaluation - Lumbar Spine  :    OBJECTIVE  Posture:  Lateral Shift: [] Right    [] Left     [] +  [x] -  Kyphosis: [] Increased [x] Decreased   []  WNL  Lordosis:  [] Increased [x] Decreased   [] WNL  Pelvic symmetry: [] WNL    [] Other:    Gait:  [] Normal     [x] Abnormal: decreased stance time on right LE    Active Movements: [] N/A   [] Too acute   [] Other:  ROM % AROM Comments:pain, area   Forward flexion 40-60 WFL    Extension 20-30 10%  Pain central back right hip   SideBend right 20-30 WFL    SideBend left 20-30 WFL    Rotation right 5-10 WFL    Rotation left 5-10 50% Pain right hip        Dural Mobility:  SLR Supine: [] Right    [] Left   [] +    [x] -  @ (degrees):   Slump Test: [] Right    [] Left   [] +    [x] -  @ (degrees):   Prone Knee Bend: [] Right    [] Left    [] +    [x] -     Strength   Left(0-5) Right (0-5) N/T   Hip Flexion (L1,2) 3+ 3+ []   Knee Extension (L3,4) 5 5 []   Ankle Dorsiflexion (L4) 5 5- []   Knee Flexion (S1,2) 3 3 []   Abdominals 5  []   Gluteus Darin 3 2+ []   Hip Abduction 3+ 2 []     Special Tests       Hip: Karen:  [x] Right    [] Left    [x] +    [] -     Scour:  [x] Right    [] Left    [x] +    [] -     Piriformis: [] Right    [] Left    [] +    [x] -       Other Tests / Comments:   Hamstring and piriformis tightness     Pain Level (0-10 scale) post treatment: 3/10    ASSESSMENT/Changes in Function: 49 yo male who presents to [facility] with c/o Low back pain. Patient reported that his legs and hips started hurting about a year ago.  Pt initially had pain in his hips which has now progressed to legs.  He states that his pain shoots across his groin abdomen and down his legs. Pt reported that when pain originally started pt tried a steroid pack with mild improvements.  Pt reports that pain is currently worsening in LBP and right side. Patient reports decreased extended standing, difficulty driving, and difficulty with walking. Patient demonstrates decreased ROM,

## 2024-04-25 ENCOUNTER — HOSPITAL ENCOUNTER (OUTPATIENT)
Facility: HOSPITAL | Age: 49
Setting detail: RECURRING SERIES
Discharge: HOME | End: 2024-04-28
Payer: COMMERCIAL

## 2024-04-25 PROCEDURE — 97110 THERAPEUTIC EXERCISES: CPT

## 2024-04-25 PROCEDURE — 97530 THERAPEUTIC ACTIVITIES: CPT

## 2024-04-25 PROCEDURE — 97112 NEUROMUSCULAR REEDUCATION: CPT

## 2024-04-25 NOTE — PROGRESS NOTES
50     [x]  Patient Education billed concurrently with other procedures   [x] Review HEP    [] Progressed/Changed HEP, detail:    [] Other detail:       Objective Information/Functional Measures/Assessment    Patient required cueing throughout exercises today to ensure proper form and execution of each exercise.  Patient noted to be most challenged with bilateral glute med strengthening and core stabilization exercises (PPT, PPT with bridge) secondary to weakness (right LE worse than left LE).  Patient was receptive to cueing and asked appropriate questions so that he can begin to learn best form of each exercise.    Patient will continue to benefit from skilled PT / OT services to modify and progress therapeutic interventions, analyze and address functional mobility deficits, analyze and address ROM deficits, analyze and address strength deficits, analyze and address soft tissue restrictions, analyze and cue for proper movement patterns, analyze and modify for postural abnormalities, and instruct in home and community integration to address functional deficits and attain remaining goals.    Progress toward goals / Updated goals:  []  See Progress Note/Recertification    Short Term Goals: To be accomplished in 8 treatments:  Patient will be independent and compliant with HEP to progress toward goals and restore functional mobility.   Eval Status: issued at eval  Current: Patient reports compliance with his HEP, but requires cueing throughout treatment as the exercises are still new to him.  4/25/24     Patient will improve FOTO score to 61 points in order to maximize function and promote patient satisfaction with overall outcome  Eval Status: FOTO 56  FOTO score = an established functional score where 100 = no disability     Patient will improve pain in low back to 5/10  to improve standing and sitting tolerance and restore prior level of function.  Eval Status: 10/10 at worst     Pt will have 3/5 trung hip

## 2024-04-26 ENCOUNTER — APPOINTMENT (OUTPATIENT)
Facility: HOSPITAL | Age: 49
End: 2024-04-26
Payer: COMMERCIAL

## 2024-05-06 NOTE — PROGRESS NOTES
Carlos Jiménez    Chief Complaint   Patient presents with    Varicose veins of both lower extremities with pain     Follow Up with studies       History and Physical    Carlos Jiménez is a 48 y.o. male with PMH significant for IDDM.     he returns today for varicose veins and pains    Since his last visit:   - has started PT for hip pain - too soon to know if improving  -No change in his leg symptoms, continues to endorse painful varicose veins bilaterally.    The most recent vascular imaging study was reviewed and discussed with the patient. This shows superficial venous insufficiency in the bilateral great saphenous veins and associated varicose veins.  There is no evidence of DVT or deep vein insufficiency    Interpretation Summary         No evidence of deep vein thrombosis in the bilateral lower extremities. Vessels demonstrate normal compressibility, color filling, and phasic and spontaneous flow.    No evidence of deep venous insufficiency bilaterally.    Superficial venous insufficiency in the bilateral great saphenous veins.    No evidence of small saphenous insufficiency bilaterally.    Multiphasic posterior tibial arteries bilaterally.     Procedure Details    A gray scale, color Doppler imaging and spectral Doppler analysis ultrasound was performed. During the study longitudinal and transverse views were obtained. Pulsed wave doppler was performed.     Lower Extremity Venous Findings    Right Lower Venous    No evidence of deep vein thrombosis. The common femoral, saphenofemoral junction, profunda, femoral, popliteal, posterior tibial, and peroneal veins were all imaged in the transverse view with normal compressibility and in the sagittal view with spontaneous, phasic flow, and normal color filling.    Posterior tibial artery Doppler waveforms are multiphasic.   Reflux study patient position: reverse Trendelenburg. Superficial venous insufficiency (>0.5 sec) noted in the great saphenous vein. Small

## 2024-05-08 ENCOUNTER — OFFICE VISIT (OUTPATIENT)
Age: 49
End: 2024-05-08
Payer: COMMERCIAL

## 2024-05-08 VITALS
HEART RATE: 88 BPM | BODY MASS INDEX: 27.09 KG/M2 | SYSTOLIC BLOOD PRESSURE: 124 MMHG | DIASTOLIC BLOOD PRESSURE: 96 MMHG | HEIGHT: 72 IN | WEIGHT: 200 LBS | OXYGEN SATURATION: 97 %

## 2024-05-08 DIAGNOSIS — I83.813 VARICOSE VEINS OF BOTH LOWER EXTREMITIES WITH PAIN: Primary | ICD-10-CM

## 2024-05-08 PROCEDURE — 99214 OFFICE O/P EST MOD 30 MIN: CPT | Performed by: SURGERY

## 2024-05-15 ENCOUNTER — HOSPITAL ENCOUNTER (OUTPATIENT)
Facility: HOSPITAL | Age: 49
Setting detail: RECURRING SERIES
End: 2024-05-15
Payer: COMMERCIAL

## 2024-05-15 ENCOUNTER — TELEPHONE (OUTPATIENT)
Facility: HOSPITAL | Age: 49
End: 2024-05-15

## 2024-05-29 ENCOUNTER — HOSPITAL ENCOUNTER (OUTPATIENT)
Facility: HOSPITAL | Age: 49
Setting detail: RECURRING SERIES
Discharge: HOME | End: 2024-06-01
Payer: COMMERCIAL

## 2024-05-29 PROCEDURE — 97530 THERAPEUTIC ACTIVITIES: CPT

## 2024-05-29 PROCEDURE — 97112 NEUROMUSCULAR REEDUCATION: CPT

## 2024-05-29 PROCEDURE — 97110 THERAPEUTIC EXERCISES: CPT

## 2024-05-29 NOTE — PROGRESS NOTES
[facility]  [address] [place] [address]  Ph [phone]  Fx [phone]    Physical Therapy Progress Note  Patient name: Carlos Jiménez Start of Care: 2024   Referral source: Rudolph Escobedo MD : 1975   Medical/Treatment Diagnosis: Other low back pain [M54.59] Onset Date:23   Prior Hospitalization: see medical history Provider#: [id number]   Medications: Verified on Patient Summary List    Comorbidities: Dm type 2, vascular insufficiency   Prior Level of Function:functionally independent, no AD, active lifestyle     Visits from Start of Care: 3    Missed Visits: 3    Updated Goals/Measure of Progress:     Short Term Goals: To be accomplished in 8 treatments:  Patient will be independent and compliant with HEP to progress toward goals and restore functional mobility.   Eval Status: issued at eval  24  PN: Pt reports partial compliance with HEP due to work schedule 24 Progressing     Patient will improve FOTO score to 61 points in order to maximize function and promote patient satisfaction with overall outcome  Eval Status: FOTO 56  24  PN: FOTO 61 MET  FOTO score = an established functional score where 100 = no disability     Patient will improve pain in low back to 5/10  to improve standing and sitting tolerance and restore prior level of function.  Eval Status: 10/10 at worst  24 PN: Pt reports 8/10 pain at worst in low back Progressing     Pt will have 3/5 trung hip extension and hip abduction strength to return to goals of improved standing and transfers.  Eval Status:     Left(0-5) Right (0-5) Left  24 Right  24   Gluteus Darin 3 2+ 3 3-   Hip Abduction 3+ 2 3+ 3-    24  PN: See above     Long Term Goals: To be accomplished in 16 treatments:  Patient will improve FOTO score to 67 points in order to maximize function and promote patient satisfaction with overall outcome  Eval Status: FOTO 56  24 PN: FOTO 61 Progressing 
tolerated  []  Discharge due to :  []  Other:    Braeden Munoz, PTA    5/29/2024    3:45 PM    Future Appointments   Date Time Provider Department Center   5/29/2024  5:50 PM [facility] PT RES CTR [facility]   6/21/2024  1:30 PM Marilyn De La Fuente MD [facility]   6/26/2024  3:30 PM [facility] HV IMAGING 1 [facility]   6/26/2024  4:15 PM [facility] NURSE [facility]   7/11/2024  3:30 PM Marilyn De La Fuente MD [facility]

## 2024-06-12 DIAGNOSIS — I83.813 VARICOSE VEINS OF BOTH LOWER EXTREMITIES WITH PAIN: Primary | ICD-10-CM

## 2024-06-12 RX ORDER — CEPHALEXIN 500 MG/1
500 CAPSULE ORAL 2 TIMES DAILY
Qty: 14 CAPSULE | Refills: 0 | Status: SHIPPED | OUTPATIENT
Start: 2024-06-12 | End: 2024-06-19

## 2024-06-12 RX ORDER — DIAZEPAM 5 MG/1
TABLET ORAL
Qty: 2 TABLET | Refills: 0 | Status: SHIPPED | OUTPATIENT
Start: 2024-06-12 | End: 2024-06-22

## 2024-06-12 RX ORDER — TRAMADOL HYDROCHLORIDE 50 MG/1
50 TABLET ORAL 2 TIMES DAILY PRN
Qty: 6 TABLET | Refills: 0 | Status: SHIPPED | OUTPATIENT
Start: 2024-06-12 | End: 2024-06-15

## 2024-06-18 ENCOUNTER — TELEPHONE (OUTPATIENT)
Facility: HOSPITAL | Age: 49
End: 2024-06-18

## 2024-06-20 DIAGNOSIS — I83.813 VARICOSE VEINS OF BOTH LOWER EXTREMITIES WITH PAIN: Primary | ICD-10-CM

## 2024-06-21 ENCOUNTER — PROCEDURE VISIT (OUTPATIENT)
Age: 49
End: 2024-06-21

## 2024-06-21 VITALS
HEART RATE: 78 BPM | DIASTOLIC BLOOD PRESSURE: 70 MMHG | BODY MASS INDEX: 27.09 KG/M2 | SYSTOLIC BLOOD PRESSURE: 118 MMHG | OXYGEN SATURATION: 100 % | WEIGHT: 200 LBS | HEIGHT: 72 IN

## 2024-06-21 DIAGNOSIS — I83.813 VARICOSE VEINS OF BOTH LOWER EXTREMITIES WITH PAIN: Primary | ICD-10-CM

## 2024-06-21 NOTE — PATIENT INSTRUCTIONS
Post Procedure Instructions for Stab Phlebectomies    Keep wrap on for the next 24 hours, then remove wrap and wear compression stockings during the day for the next two (2) weeks.   Please do not get the wrap wet. Do not shower for the first 48 hours after your procedure. No baths, pools, hot tubs until incisions have healed completely.  A prescription for antibiotics has been sent to your pharmacy. Please pick it up and start taking it tonight. Please complete the full course of antibiotics as prescribed.  Make sure to walk at least 10 minutes per hour for the remainder of the day of your procedure. Avoid walking long distances.   DO NOT lift, push or pull anything over 20 lbs for the next two (2) weeks.   Avoid prolonged standing, walking, climbing of stairs for the next three (3) days. Avoid strenuous activity for the next two (2) weeks.   If you are going to rest after your procedure, place pillows under the leg and elevate so your toes are above the level of your nose.   If you are experiencing BLEEDING, sit down, apply pressure to the bleeding area and elevate the leg. If bleeding does not stop after 20 minutes of pressure, call our office.   If needed, you may take 600mg of ibuprofen or 500 mg of tylenol every 6 hours for pain for the next two days.   Please call our office if you experience any signs or symptoms of infection, including fever (temperature > 100.5 orally), chills, redness, drainage or warmth at the incisions.  Please contact the office if you experience pain due to the bandage wrap, bleeding, increased swelling or leg pain that does not respond to Tylenol or Ibuprofen. The office number is [phone].

## 2024-06-21 NOTE — PROGRESS NOTES
[facility]    Date of Service: 6/21/2024    Surgeon: Marilyn De La Fuente MD PhD    Indication: Limb pain, painful varicose veins and documented venous insufficiency of the left lower extremity    Procedure:   Radiofrequency ablation of the left great saphenous vein (CPT 81119)  Microphlebectomies of varicose veins in the left great saphenous vein distribution, 10 stab incisions (CPT 72918)    Consent: Upon review of the patient's clinical course and symptoms, recommendation is made to proceed with radiofrequency ablation and microphlebectomies. The risks, benefits and alternatives were discussed with the patient prior to procedure and consent was obtained.     Anesthesia: Local infiltration of lidocaine, tumescent anesthesia (275ml) and Valium 10 mg PO    Specimen removed: none    EBL: minimal     Complications: none immediately apparent      Description of Procedure in Detail:     After informed consent was obtained, the patient was transferred to the procedure suite and the lower extremity evaluated in the standing position. The location of prominent and symptomatic varicose veins was marked with indelible marker. The patient was actively involved in identifying the most bothersome clusters of varices. The patient was then positioned on the the table in supine position. The left leg was prepped and draped in the usual sterile fashion.     The skin was anesthetized with 1% Lidocaine. Ultrasound guided access was obtained at the above the knee level into the left great saphenous vein after the vein had been mapped with duplex ultrasound.  The micro puncture wire was advanced and sheath placed over the wire into the saphenous vein.  The RFA Closure catheter was then advanced under direct visualization to the saphenofemoral junction.  The probes of the catheter were opened and the catheter was withdrawn to approximately 4 cm distal to the junction.  Continuous irrigation through the

## 2024-06-26 ENCOUNTER — NURSE ONLY (OUTPATIENT)
Age: 49
End: 2024-06-26

## 2024-06-26 ENCOUNTER — TELEPHONE (OUTPATIENT)
Facility: HOSPITAL | Age: 49
End: 2024-06-26

## 2024-06-26 DIAGNOSIS — I83.813 VARICOSE VEINS OF BOTH LOWER EXTREMITIES WITH PAIN: Primary | ICD-10-CM

## 2024-06-26 NOTE — PROGRESS NOTES
RTC for post phlebectomy incision check. All stabs intact. No bruising or swelling. Some tenderness in the thigh. Will continue to wear compression.

## 2024-07-05 ENCOUNTER — TELEPHONE (OUTPATIENT)
Facility: HOSPITAL | Age: 49
End: 2024-07-05

## 2024-07-05 NOTE — TELEPHONE ENCOUNTER
LVM DC patient due to insurance reasons 30-day/3-call DC policy. Explained that jessica phipps  in . Informed of how to restart PT if needed

## 2024-07-10 NOTE — PROGRESS NOTES
Carlos Jiménez    Chief Complaint   Patient presents with    Varicose veins of both lower extremities with pain     Follow Up       History and Physical    Carlos Jiménez is a 49 y.o. male with venous insufficiency     he returns in follow up after L [facility] RF and stab phlebectomies on 6/24/24. This procedure was originally performed for painful varicose veins.     Today the patient reports improvement of his symptoms. States his left leg feels much better.     The most recent PVL was reviewed and discussed with the patient. This shows successful closure of the left great saphenous vein without evidence of DVT.     Interpretation Summary         Status post mechanical occlusion of the left great saphenous thigh vein beginning 3.06 cm away from the saphenofemoral junction and extending to the knee segment. The mechanical occlusion classification is T2a: No discernable shrinkage, vein incompressible under ultrasound probe.    Patent left common femoral vein with no evidence of thrombus and acceptable hemodynamics.     Procedure Details    A gray scale, color Doppler imaging and spectral Doppler analysis ultrasound was performed. During the study longitudinal and transverse views were obtained. Pulsed wave doppler was performed.     Lower Extremity Venous Findings    Left Lower Venous    Common Femoral Vein: Patent, normal phasicity, spontaneous, normal augmentation, compressible.         There is a mechanical occlusion present in the great saphenous thigh vein(s). The mechanical occlusion classification is T2a: No discernable shrinkage, vein incompressible under ultrasound probe.     The closure begins 3.06 cm away from the saphenofemoral junction.       Past Medical History:   Diagnosis Date    Diabetes (HCC)      Past Surgical History:   Procedure Laterality Date    SHOULDER SURGERY Left      Patient Active Problem List   Diagnosis    Varicose veins of both lower extremities with pain     Current Outpatient Medications

## 2024-07-11 ENCOUNTER — OFFICE VISIT (OUTPATIENT)
Age: 49
End: 2024-07-11
Payer: COMMERCIAL

## 2024-07-11 VITALS
HEART RATE: 79 BPM | DIASTOLIC BLOOD PRESSURE: 88 MMHG | HEIGHT: 72 IN | BODY MASS INDEX: 27.63 KG/M2 | SYSTOLIC BLOOD PRESSURE: 122 MMHG | WEIGHT: 204 LBS | OXYGEN SATURATION: 98 %

## 2024-07-11 DIAGNOSIS — I83.813 VARICOSE VEINS OF BOTH LOWER EXTREMITIES WITH PAIN: Primary | ICD-10-CM

## 2024-07-11 PROCEDURE — 99213 OFFICE O/P EST LOW 20 MIN: CPT | Performed by: SURGERY

## 2024-07-11 NOTE — PATIENT INSTRUCTIONS
You may start applying Arnica or Voltaren cream to your bruised areas to help with discoloration and accelerate healing.   Apply a small amount twice daily until satisfactory results are achieved.

## 2024-08-05 ENCOUNTER — TELEPHONE (OUTPATIENT)
Age: 49
End: 2024-08-05

## 2024-08-05 NOTE — TELEPHONE ENCOUNTER
Patient called at 11:00am to cancel his upcoming ablation procedure with Dr. De La Fuente for Right Lower Extremity Great Saphenous Vein Radiofrequency ablation with Stab phlebectomy. He  had a lot going on and would like to reschedule in September. He will call back beginning of September for a date. All upcoming appointments cancelled.

## 2025-04-21 ENCOUNTER — HOSPITAL ENCOUNTER (INPATIENT)
Facility: HOSPITAL | Age: 50
LOS: 2 days | Discharge: HOME | DRG: 638 | End: 2025-04-23
Attending: HOSPITALIST | Admitting: HOSPITALIST
Payer: COMMERCIAL

## 2025-04-21 ENCOUNTER — APPOINTMENT (OUTPATIENT)
Facility: HOSPITAL | Age: 50
DRG: 638 | End: 2025-04-21
Payer: COMMERCIAL

## 2025-04-21 DIAGNOSIS — N17.9 AKI (ACUTE KIDNEY INJURY): ICD-10-CM

## 2025-04-21 DIAGNOSIS — B02.9 HERPES ZOSTER WITHOUT COMPLICATION: ICD-10-CM

## 2025-04-21 DIAGNOSIS — E10.10 DIABETIC KETOACIDOSIS WITHOUT COMA ASSOCIATED WITH TYPE 1 DIABETES MELLITUS (HCC): Primary | ICD-10-CM

## 2025-04-21 PROBLEM — E87.20 METABOLIC ACIDOSIS: Status: ACTIVE | Noted: 2025-04-21

## 2025-04-21 PROBLEM — E87.8 ELECTROLYTE IMBALANCE: Status: ACTIVE | Noted: 2025-04-21

## 2025-04-21 PROBLEM — E11.10 DKA (DIABETIC KETOACIDOSIS) (HCC): Status: ACTIVE | Noted: 2025-04-21

## 2025-04-21 LAB
ALBUMIN SERPL-MCNC: 4.2 G/DL (ref 3.4–5)
ALBUMIN/GLOB SERPL: 0.9 (ref 0.8–1.7)
ALP SERPL-CCNC: 129 U/L (ref 45–117)
ALT SERPL-CCNC: 50 U/L (ref 16–61)
ANION GAP BLD CALC-SCNC: ABNORMAL MMOL/L (ref 10–20)
ANION GAP SERPL CALC-SCNC: 11 MMOL/L (ref 3–18)
ANION GAP SERPL CALC-SCNC: 14 MMOL/L (ref 3–18)
ANION GAP SERPL CALC-SCNC: 21 MMOL/L (ref 3–18)
APPEARANCE UR: CLEAR
AST SERPL-CCNC: 24 U/L (ref 10–38)
BACTERIA URNS QL MICRO: ABNORMAL /HPF
BASE DEFICIT BLD-SCNC: 15.2 MMOL/L
BASOPHILS # BLD: 0.03 K/UL (ref 0–0.1)
BASOPHILS NFR BLD: 0.4 % (ref 0–2)
BILIRUB SERPL-MCNC: 1.1 MG/DL (ref 0.2–1)
BILIRUB UR QL: NEGATIVE
BUN SERPL-MCNC: 14 MG/DL (ref 7–18)
BUN SERPL-MCNC: 16 MG/DL (ref 7–18)
BUN SERPL-MCNC: 19 MG/DL (ref 7–18)
BUN/CREAT SERPL: 12 (ref 12–20)
CA-I BLD-MCNC: 1.17 MMOL/L (ref 1.15–1.33)
CALCIUM SERPL-MCNC: 7.9 MG/DL (ref 8.5–10.1)
CALCIUM SERPL-MCNC: 8.4 MG/DL (ref 8.5–10.1)
CALCIUM SERPL-MCNC: 9.5 MG/DL (ref 8.5–10.1)
CHLORIDE BLD-SCNC: 107 MMOL/L (ref 98–107)
CHLORIDE SERPL-SCNC: 104 MMOL/L (ref 100–111)
CHLORIDE SERPL-SCNC: 106 MMOL/L (ref 100–111)
CHLORIDE SERPL-SCNC: 96 MMOL/L (ref 100–111)
CO2 BLD-SCNC: 11 MMOL/L (ref 22–29)
CO2 SERPL-SCNC: 12 MMOL/L (ref 21–32)
CO2 SERPL-SCNC: 17 MMOL/L (ref 21–32)
CO2 SERPL-SCNC: 18 MMOL/L (ref 21–32)
COLOR UR: YELLOW
CREAT BLD-MCNC: 0.81 MG/DL (ref 0.6–1.3)
CREAT SERPL-MCNC: 1.18 MG/DL (ref 0.6–1.3)
CREAT SERPL-MCNC: 1.39 MG/DL (ref 0.6–1.3)
CREAT SERPL-MCNC: 1.59 MG/DL (ref 0.6–1.3)
DIFFERENTIAL METHOD BLD: ABNORMAL
EOSINOPHIL # BLD: 0.01 K/UL (ref 0–0.4)
EOSINOPHIL NFR BLD: 0.1 % (ref 0–5)
EPITH CASTS URNS QL MICRO: ABNORMAL /LPF (ref 0–5)
ERYTHROCYTE [DISTWIDTH] IN BLOOD BY AUTOMATED COUNT: 11.5 % (ref 11.6–14.5)
EST. AVERAGE GLUCOSE BLD GHB EST-MCNC: 229 MG/DL
GLOBULIN SER CALC-MCNC: 4.9 G/DL (ref 2–4)
GLUCOSE BLD STRIP.AUTO-MCNC: 131 MG/DL (ref 70–110)
GLUCOSE BLD STRIP.AUTO-MCNC: 136 MG/DL (ref 70–110)
GLUCOSE BLD STRIP.AUTO-MCNC: 150 MG/DL (ref 70–110)
GLUCOSE BLD STRIP.AUTO-MCNC: 153 MG/DL (ref 70–110)
GLUCOSE BLD STRIP.AUTO-MCNC: 161 MG/DL (ref 70–110)
GLUCOSE BLD STRIP.AUTO-MCNC: 177 MG/DL (ref 70–110)
GLUCOSE BLD STRIP.AUTO-MCNC: 179 MG/DL (ref 70–110)
GLUCOSE BLD STRIP.AUTO-MCNC: 187 MG/DL (ref 70–110)
GLUCOSE BLD STRIP.AUTO-MCNC: 231 MG/DL (ref 70–110)
GLUCOSE BLD STRIP.AUTO-MCNC: 233 MG/DL (ref 70–110)
GLUCOSE BLD STRIP.AUTO-MCNC: 317 MG/DL (ref 70–110)
GLUCOSE BLD-MCNC: 274 MG/DL (ref 74–99)
GLUCOSE SERPL-MCNC: 168 MG/DL (ref 74–99)
GLUCOSE SERPL-MCNC: 204 MG/DL (ref 74–99)
GLUCOSE SERPL-MCNC: 312 MG/DL (ref 74–99)
GLUCOSE UR STRIP.AUTO-MCNC: >1000 MG/DL
HBA1C MFR BLD: 9.6 % (ref 4.2–5.6)
HCO3 BLD-SCNC: 11.7 MMOL/L (ref 21–28)
HCT VFR BLD AUTO: 57.4 % (ref 36–48)
HGB BLD-MCNC: 18.5 G/DL (ref 13–16)
HGB UR QL STRIP: NEGATIVE
IMM GRANULOCYTES # BLD AUTO: 0.04 K/UL (ref 0–0.04)
IMM GRANULOCYTES NFR BLD AUTO: 0.5 % (ref 0–0.5)
KETONES UR QL STRIP.AUTO: >160 MG/DL
LACTATE BLD-SCNC: 1.48 MMOL/L (ref 0.4–2)
LEUKOCYTE ESTERASE UR QL STRIP.AUTO: NEGATIVE
LIPASE SERPL-CCNC: 30 U/L (ref 13–75)
LYMPHOCYTES # BLD: 1.31 K/UL (ref 0.9–3.3)
LYMPHOCYTES NFR BLD: 15.4 % (ref 21–52)
MAGNESIUM SERPL-MCNC: 1.7 MG/DL (ref 1.6–2.6)
MAGNESIUM SERPL-MCNC: 1.9 MG/DL (ref 1.6–2.6)
MAGNESIUM SERPL-MCNC: 2.1 MG/DL (ref 1.6–2.6)
MCH RBC QN AUTO: 31.8 PG (ref 24–34)
MCHC RBC AUTO-ENTMCNC: 32.2 G/DL (ref 31–37)
MCV RBC AUTO: 98.6 FL (ref 78–100)
MONOCYTES # BLD: 0.38 K/UL (ref 0.05–1.2)
MONOCYTES NFR BLD: 4.5 % (ref 3–10)
MUCOUS THREADS URNS QL MICRO: ABNORMAL /LPF
NEUTS SEG # BLD: 6.75 K/UL (ref 1.8–8)
NEUTS SEG NFR BLD: 79.1 % (ref 40–73)
NITRITE UR QL STRIP.AUTO: NEGATIVE
NRBC # BLD: 0 K/UL (ref 0–0.01)
NRBC BLD-RTO: 0 PER 100 WBC
PCO2 BLDV: 31 MMHG (ref 41–51)
PH BLDV: 7.18 (ref 7.32–7.42)
PH UR STRIP: 5.5 (ref 5–8)
PHOSPHATE SERPL-MCNC: 1.8 MG/DL (ref 2.5–4.9)
PHOSPHATE SERPL-MCNC: 2.3 MG/DL (ref 2.5–4.9)
PLATELET # BLD AUTO: 273 K/UL (ref 135–420)
PMV BLD AUTO: 9.5 FL (ref 9.2–11.8)
PO2 BLDV: 46 MMHG (ref 25–40)
POTASSIUM BLD-SCNC: 5.8 MMOL/L (ref 3.5–5.1)
POTASSIUM SERPL-SCNC: 3.9 MMOL/L (ref 3.5–5.5)
POTASSIUM SERPL-SCNC: 4.3 MMOL/L (ref 3.5–5.5)
POTASSIUM SERPL-SCNC: 4.6 MMOL/L (ref 3.5–5.5)
PROT SERPL-MCNC: 9.1 G/DL (ref 6.4–8.2)
PROT UR STRIP-MCNC: 100 MG/DL
RBC # BLD AUTO: 5.82 M/UL (ref 4.35–5.65)
RBC #/AREA URNS HPF: ABNORMAL /HPF (ref 0–5)
SAO2 % BLD: 71 % (ref 94–98)
SERVICE CMNT-IMP: ABNORMAL
SODIUM BLD-SCNC: 130 MMOL/L (ref 136–145)
SODIUM SERPL-SCNC: 129 MMOL/L (ref 136–145)
SODIUM SERPL-SCNC: 135 MMOL/L (ref 136–145)
SODIUM SERPL-SCNC: 135 MMOL/L (ref 136–145)
SP GR UR REFRACTOMETRY: >1.03 (ref 1–1.03)
SPECIMEN SITE: ABNORMAL
TROPONIN I SERPL HS-MCNC: 8 NG/L (ref 0–78)
UROBILINOGEN UR QL STRIP.AUTO: 1 EU/DL (ref 0.2–1)
WBC # BLD AUTO: 8.5 K/UL (ref 4.6–13.2)
WBC URNS QL MICRO: ABNORMAL /HPF (ref 0–5)

## 2025-04-21 PROCEDURE — 84484 ASSAY OF TROPONIN QUANT: CPT

## 2025-04-21 PROCEDURE — 81001 URINALYSIS AUTO W/SCOPE: CPT

## 2025-04-21 PROCEDURE — 85025 COMPLETE CBC W/AUTO DIFF WBC: CPT

## 2025-04-21 PROCEDURE — 96374 THER/PROPH/DIAG INJ IV PUSH: CPT

## 2025-04-21 PROCEDURE — 83735 ASSAY OF MAGNESIUM: CPT

## 2025-04-21 PROCEDURE — 84132 ASSAY OF SERUM POTASSIUM: CPT

## 2025-04-21 PROCEDURE — 80053 COMPREHEN METABOLIC PANEL: CPT

## 2025-04-21 PROCEDURE — 85014 HEMATOCRIT: CPT

## 2025-04-21 PROCEDURE — 6360000002 HC RX W HCPCS: Performed by: HOSPITALIST

## 2025-04-21 PROCEDURE — 2580000003 HC RX 258: Performed by: HOSPITALIST

## 2025-04-21 PROCEDURE — 93005 ELECTROCARDIOGRAM TRACING: CPT | Performed by: PHYSICIAN ASSISTANT

## 2025-04-21 PROCEDURE — 83036 HEMOGLOBIN GLYCOSYLATED A1C: CPT

## 2025-04-21 PROCEDURE — 96375 TX/PRO/DX INJ NEW DRUG ADDON: CPT

## 2025-04-21 PROCEDURE — 83605 ASSAY OF LACTIC ACID: CPT

## 2025-04-21 PROCEDURE — 2000000000 HC ICU R&B

## 2025-04-21 PROCEDURE — 83690 ASSAY OF LIPASE: CPT

## 2025-04-21 PROCEDURE — 6370000000 HC RX 637 (ALT 250 FOR IP): Performed by: HOSPITALIST

## 2025-04-21 PROCEDURE — 6360000002 HC RX W HCPCS: Performed by: PHYSICIAN ASSISTANT

## 2025-04-21 PROCEDURE — 84100 ASSAY OF PHOSPHORUS: CPT

## 2025-04-21 PROCEDURE — 82803 BLOOD GASES ANY COMBINATION: CPT

## 2025-04-21 PROCEDURE — 71045 X-RAY EXAM CHEST 1 VIEW: CPT

## 2025-04-21 PROCEDURE — 2500000003 HC RX 250 WO HCPCS: Performed by: HOSPITALIST

## 2025-04-21 PROCEDURE — 6370000000 HC RX 637 (ALT 250 FOR IP): Performed by: PHYSICIAN ASSISTANT

## 2025-04-21 PROCEDURE — 84295 ASSAY OF SERUM SODIUM: CPT

## 2025-04-21 PROCEDURE — 2580000003 HC RX 258: Performed by: PHYSICIAN ASSISTANT

## 2025-04-21 PROCEDURE — 82962 GLUCOSE BLOOD TEST: CPT

## 2025-04-21 PROCEDURE — 82330 ASSAY OF CALCIUM: CPT

## 2025-04-21 PROCEDURE — 99285 EMERGENCY DEPT VISIT HI MDM: CPT

## 2025-04-21 PROCEDURE — 96361 HYDRATE IV INFUSION ADD-ON: CPT

## 2025-04-21 PROCEDURE — 82947 ASSAY GLUCOSE BLOOD QUANT: CPT

## 2025-04-21 PROCEDURE — 80048 BASIC METABOLIC PNL TOTAL CA: CPT

## 2025-04-21 RX ORDER — 0.9 % SODIUM CHLORIDE 0.9 %
1000 INTRAVENOUS SOLUTION INTRAVENOUS ONCE
Status: COMPLETED | OUTPATIENT
Start: 2025-04-21 | End: 2025-04-21

## 2025-04-21 RX ORDER — ONDANSETRON 2 MG/ML
4 INJECTION INTRAMUSCULAR; INTRAVENOUS
Status: COMPLETED | OUTPATIENT
Start: 2025-04-21 | End: 2025-04-21

## 2025-04-21 RX ORDER — MAGNESIUM SULFATE IN WATER 40 MG/ML
2000 INJECTION, SOLUTION INTRAVENOUS PRN
Status: DISCONTINUED | OUTPATIENT
Start: 2025-04-21 | End: 2025-04-21 | Stop reason: SDUPTHER

## 2025-04-21 RX ORDER — 0.9 % SODIUM CHLORIDE 0.9 %
15 INTRAVENOUS SOLUTION INTRAVENOUS ONCE
Status: DISCONTINUED | OUTPATIENT
Start: 2025-04-21 | End: 2025-04-23 | Stop reason: HOSPADM

## 2025-04-21 RX ORDER — DEXTROSE MONOHYDRATE AND SODIUM CHLORIDE 5; .45 G/100ML; G/100ML
INJECTION, SOLUTION INTRAVENOUS CONTINUOUS PRN
Status: DISCONTINUED | OUTPATIENT
Start: 2025-04-21 | End: 2025-04-23 | Stop reason: HOSPADM

## 2025-04-21 RX ORDER — MORPHINE SULFATE 2 MG/ML
2 INJECTION, SOLUTION INTRAMUSCULAR; INTRAVENOUS EVERY 4 HOURS PRN
Status: DISCONTINUED | OUTPATIENT
Start: 2025-04-21 | End: 2025-04-23 | Stop reason: HOSPADM

## 2025-04-21 RX ORDER — ACETAMINOPHEN 325 MG/1
650 TABLET ORAL EVERY 6 HOURS PRN
Status: DISCONTINUED | OUTPATIENT
Start: 2025-04-21 | End: 2025-04-21

## 2025-04-21 RX ORDER — POTASSIUM CHLORIDE 7.45 MG/ML
10 INJECTION INTRAVENOUS PRN
Status: DISCONTINUED | OUTPATIENT
Start: 2025-04-21 | End: 2025-04-21 | Stop reason: SDUPTHER

## 2025-04-21 RX ORDER — MAGNESIUM SULFATE IN WATER 40 MG/ML
2000 INJECTION, SOLUTION INTRAVENOUS PRN
Status: DISCONTINUED | OUTPATIENT
Start: 2025-04-21 | End: 2025-04-23 | Stop reason: HOSPADM

## 2025-04-21 RX ORDER — ENOXAPARIN SODIUM 100 MG/ML
40 INJECTION SUBCUTANEOUS DAILY
Status: DISCONTINUED | OUTPATIENT
Start: 2025-04-21 | End: 2025-04-23 | Stop reason: HOSPADM

## 2025-04-21 RX ORDER — POTASSIUM CHLORIDE 7.45 MG/ML
10 INJECTION INTRAVENOUS PRN
Status: DISCONTINUED | OUTPATIENT
Start: 2025-04-21 | End: 2025-04-23 | Stop reason: HOSPADM

## 2025-04-21 RX ORDER — DEXTROSE MONOHYDRATE, SODIUM CHLORIDE, AND POTASSIUM CHLORIDE 50; 1.49; 4.5 G/1000ML; G/1000ML; G/1000ML
INJECTION, SOLUTION INTRAVENOUS CONTINUOUS PRN
Status: DISCONTINUED | OUTPATIENT
Start: 2025-04-21 | End: 2025-04-23 | Stop reason: HOSPADM

## 2025-04-21 RX ORDER — POLYETHYLENE GLYCOL 3350 17 G/17G
17 POWDER, FOR SOLUTION ORAL DAILY PRN
Status: DISCONTINUED | OUTPATIENT
Start: 2025-04-21 | End: 2025-04-23 | Stop reason: HOSPADM

## 2025-04-21 RX ORDER — SODIUM CHLORIDE, SODIUM LACTATE, POTASSIUM CHLORIDE, CALCIUM CHLORIDE 600; 310; 30; 20 MG/100ML; MG/100ML; MG/100ML; MG/100ML
INJECTION, SOLUTION INTRAVENOUS CONTINUOUS
Status: DISCONTINUED | OUTPATIENT
Start: 2025-04-21 | End: 2025-04-23 | Stop reason: HOSPADM

## 2025-04-21 RX ORDER — ONDANSETRON 2 MG/ML
4 INJECTION INTRAMUSCULAR; INTRAVENOUS EVERY 6 HOURS PRN
Status: DISCONTINUED | OUTPATIENT
Start: 2025-04-21 | End: 2025-04-23 | Stop reason: HOSPADM

## 2025-04-21 RX ORDER — MORPHINE SULFATE 4 MG/ML
4 INJECTION, SOLUTION INTRAMUSCULAR; INTRAVENOUS EVERY 4 HOURS PRN
Status: DISCONTINUED | OUTPATIENT
Start: 2025-04-21 | End: 2025-04-23 | Stop reason: HOSPADM

## 2025-04-21 RX ORDER — PROCHLORPERAZINE EDISYLATE 5 MG/ML
10 INJECTION INTRAMUSCULAR; INTRAVENOUS EVERY 6 HOURS PRN
Status: DISCONTINUED | OUTPATIENT
Start: 2025-04-21 | End: 2025-04-23 | Stop reason: HOSPADM

## 2025-04-21 RX ORDER — SODIUM CHLORIDE 9 MG/ML
INJECTION, SOLUTION INTRAVENOUS CONTINUOUS
Status: DISCONTINUED | OUTPATIENT
Start: 2025-04-21 | End: 2025-04-23 | Stop reason: HOSPADM

## 2025-04-21 RX ORDER — GABAPENTIN 100 MG/1
100 CAPSULE ORAL EVERY 8 HOURS PRN
Status: DISCONTINUED | OUTPATIENT
Start: 2025-04-21 | End: 2025-04-21

## 2025-04-21 RX ORDER — GABAPENTIN 100 MG/1
200 CAPSULE ORAL EVERY 8 HOURS PRN
Status: DISCONTINUED | OUTPATIENT
Start: 2025-04-21 | End: 2025-04-23 | Stop reason: HOSPADM

## 2025-04-21 RX ORDER — ACETAMINOPHEN 500 MG
1000 TABLET ORAL EVERY 6 HOURS PRN
Status: DISCONTINUED | OUTPATIENT
Start: 2025-04-21 | End: 2025-04-23 | Stop reason: HOSPADM

## 2025-04-21 RX ADMIN — SODIUM CHLORIDE 1000 ML: 900 INJECTION, SOLUTION INTRAVENOUS at 11:08

## 2025-04-21 RX ADMIN — POTASSIUM CHLORIDE 10 MEQ: 7.46 INJECTION, SOLUTION INTRAVENOUS at 17:59

## 2025-04-21 RX ADMIN — ONDANSETRON 4 MG: 2 INJECTION, SOLUTION INTRAMUSCULAR; INTRAVENOUS at 15:38

## 2025-04-21 RX ADMIN — SODIUM PHOSPHATE, MONOBASIC, MONOHYDRATE AND SODIUM PHOSPHATE, DIBASIC, ANHYDROUS 15 MMOL: 142; 276 INJECTION, SOLUTION INTRAVENOUS at 20:11

## 2025-04-21 RX ADMIN — POTASSIUM CHLORIDE 10 MEQ: 7.46 INJECTION, SOLUTION INTRAVENOUS at 21:41

## 2025-04-21 RX ADMIN — POTASSIUM CHLORIDE 10 MEQ: 7.46 INJECTION, SOLUTION INTRAVENOUS at 18:54

## 2025-04-21 RX ADMIN — ENOXAPARIN SODIUM 40 MG: 100 INJECTION SUBCUTANEOUS at 13:49

## 2025-04-21 RX ADMIN — POTASSIUM CHLORIDE 10 MEQ: 7.46 INJECTION, SOLUTION INTRAVENOUS at 23:39

## 2025-04-21 RX ADMIN — POTASSIUM CHLORIDE, DEXTROSE MONOHYDRATE AND SODIUM CHLORIDE: 150; 5; 450 INJECTION, SOLUTION INTRAVENOUS at 15:17

## 2025-04-21 RX ADMIN — MORPHINE SULFATE 2 MG: 2 INJECTION, SOLUTION INTRAMUSCULAR; INTRAVENOUS at 15:38

## 2025-04-21 RX ADMIN — POTASSIUM CHLORIDE, DEXTROSE MONOHYDRATE AND SODIUM CHLORIDE: 150; 5; 450 INJECTION, SOLUTION INTRAVENOUS at 22:21

## 2025-04-21 RX ADMIN — ACETAMINOPHEN 1000 MG: 500 TABLET ORAL at 13:49

## 2025-04-21 RX ADMIN — POTASSIUM CHLORIDE 10 MEQ: 7.46 INJECTION, SOLUTION INTRAVENOUS at 22:37

## 2025-04-21 RX ADMIN — PROCHLORPERAZINE EDISYLATE 10 MG: 5 INJECTION INTRAMUSCULAR; INTRAVENOUS at 19:40

## 2025-04-21 RX ADMIN — FAMOTIDINE 20 MG: 10 INJECTION, SOLUTION INTRAVENOUS at 11:05

## 2025-04-21 RX ADMIN — SODIUM CHLORIDE 3.5 UNITS/HR: 9 INJECTION, SOLUTION INTRAVENOUS at 13:25

## 2025-04-21 RX ADMIN — ONDANSETRON 4 MG: 2 INJECTION, SOLUTION INTRAMUSCULAR; INTRAVENOUS at 11:05

## 2025-04-21 ASSESSMENT — PAIN DESCRIPTION - LOCATION
LOCATION: ABDOMEN
LOCATION: HEAD

## 2025-04-21 ASSESSMENT — PAIN SCALES - GENERAL
PAINLEVEL_OUTOF10: 0
PAINLEVEL_OUTOF10: 3
PAINLEVEL_OUTOF10: 8
PAINLEVEL_OUTOF10: 3
PAINLEVEL_OUTOF10: 3
PAINLEVEL_OUTOF10: 0

## 2025-04-21 ASSESSMENT — PAIN DESCRIPTION - DESCRIPTORS
DESCRIPTORS: TENDER
DESCRIPTORS: CRAMPING

## 2025-04-21 ASSESSMENT — LIFESTYLE VARIABLES
HOW OFTEN DO YOU HAVE A DRINK CONTAINING ALCOHOL: MONTHLY OR LESS
HOW MANY STANDARD DRINKS CONTAINING ALCOHOL DO YOU HAVE ON A TYPICAL DAY: 1 OR 2

## 2025-04-21 ASSESSMENT — PAIN DESCRIPTION - ORIENTATION: ORIENTATION: LOWER;UPPER;LEFT;RIGHT

## 2025-04-21 ASSESSMENT — PAIN - FUNCTIONAL ASSESSMENT: PAIN_FUNCTIONAL_ASSESSMENT: 0-10

## 2025-04-21 NOTE — ED TRIAGE NOTES
Per [facility], patient will arrive via private vehicle for possible DKA and gastroparesis. Patient has a Co2 of 10. Patient refused medical transport to the hospital and will arrive via private vehicle.     Active Ambulatory Problems     Diagnosis Date Noted    Varicose veins of both lower extremities with pain 04/03/2024     Resolved Ambulatory Problems     Diagnosis Date Noted    No Resolved Ambulatory Problems     Past Medical History:   Diagnosis Date    Diabetes (HCC)

## 2025-04-21 NOTE — PROGRESS NOTES
Diabetic Insulin Drip Monitoring    Patient Name: Carlos Jiménez   YOB: 1975     Progress Note: 4/21/2025 12:11 PM     Assessment This Admission:   Active Problems:    * No active hospital problems. *  Resolved Problems:    * No resolved hospital problems. *      Most Recent Labs:     Lab Results   Component Value Date/Time    K 4.6 04/21/2025 10:55 AM    MG 2.1 04/21/2025 10:55 AM    CO2 12 04/21/2025 10:55 AM      Most Recent POC Glucose:  No results for input(s): \"GLUCPOC\" in the last 72 hours.  Calculated Creatinine Clearance:  Serum creatinine: 1.59 mg/dL (H) 04/21/25 1055  Estimated creatinine clearance: 69 mL/min (A)    ---------------------------------------------------------------------------------------------------  Serum Potassium Guidelines for patients receiving continuous Insulin Drip (per ADA DKA Guidelines 2014):         1. Must have baseline serum K result >/= 3.3 (within 6 hours)  prior to initialization of insulin drip         2. Provider is to be contacted for authorization if most recent serum K (within 6 hrs) <3.3 upon insulin drip reorder.          3. Recommend holding insulin drip until potassium is replaced to prevent severe hypokalemia and resultant life threatening arrhthymias and respiratory muscle weakness.         4.  If provider declines to hold insulin drip and the serum K is < 3.0 contact clinical coordinator or director of pharmacy.         5.  If provider declines to hold the insulin drip and the serum K is less severe consider recommendations to facilitate keeping serum K levels within normal limits, such as:     - Addition of potassium to IV fluids   - Additional doses of IV or PO potassium   - More frequent monitoring of serum K levels   *These are general recommendations and should be considered along with additional information about the patient's clinical status; such as cardiac history and renal function     Insulin Drip/Potassium Replacement Status:  This

## 2025-04-21 NOTE — PROGRESS NOTES
TRANSFER - IN REPORT:    Verbal report received from Eileen CANNON on Carlos Jiménez  being received from ED for routine progression of patient care      Report consisted of patient's Situation, Background, Assessment and   Recommendations(SBAR).     Information from the following report(s) Nurse Handoff Report, ED Encounter Summary, ED SBAR, and MAR was reviewed with the receiving nurse.    Opportunity for questions and clarification was provided.      Assessment completed upon patient's arrival to unit and care assumed.       1509: Introduced self and preceptor to pt and pt's spouse, oriented to room and plan of care. Assessment per flowsheets. Medicated per MAR. Rounded with Dr Enriquez and Dr Vargas. Pt put on contact isolation due to concern for scabbed shingles lesions on face.    [id number]: medicated per MAR. Pt reports improvement of pain and nausea after PRN zofran and morphine.  Pt resting in bed with cell phone, remote control, and call bell, wife at bedside. No complaints at this time.     [id number]:  Medicated per MAR. Pt resting in bed with cell phone, remote control, and call bell. No complaints at this time.     0397-1300: Medicated per MAR.    1900: Bedside shift report given to KARYN Lopez RN and JEREMY Artis RN. Insulin gtt signed off per MAR.

## 2025-04-21 NOTE — PLAN OF CARE
Problem: ABCDS Injury Assessment  Goal: Absence of physical injury  Outcome: Progressing  Flowsheets (Taken 4/21/2025 1600)  Absence of Physical Injury: Implement safety measures based on patient assessment     Problem: Chronic Conditions and Co-morbidities  Goal: Patient's chronic conditions and co-morbidity symptoms are monitored and maintained or improved  Outcome: Progressing  Flowsheets (Taken 4/21/2025 1509)  Care Plan - Patient's Chronic Conditions and Co-Morbidity Symptoms are Monitored and Maintained or Improved:   Monitor and assess patient's chronic conditions and comorbid symptoms for stability, deterioration, or improvement   Collaborate with multidisciplinary team to address chronic and comorbid conditions and prevent exacerbation or deterioration   Update acute care plan with appropriate goals if chronic or comorbid symptoms are exacerbated and prevent overall improvement and discharge     Problem: Discharge Planning  Goal: Discharge to home or other facility with appropriate resources  Outcome: Progressing  Flowsheets (Taken 4/21/2025 1500)  Discharge to home or other facility with appropriate resources:   Identify barriers to discharge with patient and caregiver   Arrange for needed discharge resources and transportation as appropriate   Identify discharge learning needs (meds, wound care, etc)   Refer to discharge planning if patient needs post-hospital services based on physician order or complex needs related to functional status, cognitive ability or social support system     Problem: Pain  Goal: Verbalizes/displays adequate comfort level or baseline comfort level  Outcome: Progressing     Problem: Safety - Adult  Goal: Free from fall injury  Outcome: Progressing

## 2025-04-21 NOTE — ED PROVIDER NOTES
[facility]  EMERGENCY DEPARTMENT ENCOUNTER       Pt Name: Carlos Jiménez  MRN: [id number]  Birthdate 1975  Date of evaluation: 4/21/2025  PCP: Tomi Molina APRN - NP  Note Started: 1:15 PM 4/21/25     CHIEF COMPLAINT       Chief Complaint   Patient presents with    Abdominal Pain        HISTORY OF PRESENT ILLNESS: 1 or more elements      History From: Patient  HPI Limitations: None  Chronic Conditions: type1 IDDM  Social Determinants affecting Dx or Tx: none      Carlos Jiménez is a 49 y.o. male who presents to ED c/o nausea vomiting epigastric pain.  Patient states he initially developed a rash on his face and around his right eye for which he was seen by televisit at urgent care on 4/11/2025.  He was prescribed methylprednisolone taper for unknown reason, which he started on 4/13/2025.  Patient states his rash to his face is crusted, much less painful and resolving but for the last week he has had increasing nausea epigastric discomfort, intermittent vomiting that became much worse over the last 2 to 3 days.  He states everything he tries to eat comes back up and he is feeling fatigued and weak.  He went to urgent care today had CO2 of 10 and was sent to ER for possible DKA     Nursing Notes were all reviewed and agreed with or any disagreements were addressed in the HPI.    PAST HISTORY     Past Medical History:  Past Medical History:   Diagnosis Date    Diabetes (HCC)     DKA (diabetic ketoacidosis) (HCC) 04/21/2025    Shingles 04/2025       Past Surgical History:  Past Surgical History:   Procedure Laterality Date    SHOULDER SURGERY Left        Family History:  No family history on file.    Social History:  Social History     Socioeconomic History    Marital status: Single     Spouse name: None    Number of children: None    Years of education: None    Highest education level: None   Tobacco Use    Smoking status: Former     Types: Cigars    Smokeless tobacco: Never

## 2025-04-21 NOTE — ED TRIAGE NOTES
Pt alert and conversational. Ambulated to triage. Was sent by [facility] for possible DKA.   C/O abdominal pain N/V/C. Last BM 6 days ago.    in triage    Active Ambulatory Problems     Diagnosis Date Noted    Varicose veins of both lower extremities with pain 04/03/2024     Resolved Ambulatory Problems     Diagnosis Date Noted    No Resolved Ambulatory Problems     Past Medical History:   Diagnosis Date    Diabetes (HCC)

## 2025-04-21 NOTE — CONSULTS
[facility]  Pulmonary, Critical Care, and Sleep Medicine    Name: Carlos Jiménez MRN: [id number]   : 1975 Hospital: [facility]    Date: 2025  Room: ER10/10     [facility] Note                                              Consult requesting physician: Dr. Enriquez  Reason for Consult: Metabolic acidosis, DKA, electrolyte imbalance    IMPRESSION:   Metabolic acidosis  DKA  Dehydration electrolyte imbalance  Facial rash  Recent steroid use.        Active Hospital Problems    Diagnosis Date Noted    DKA (diabetic ketoacidosis) ([facility]) [E11.10] 2025    DKA, type 1, not at goal (HCC) [E10.10] 2025    Shingles rash [B02.9] 2025    Metabolic acidosis [E87.20] 2025    Electrolyte imbalance [E87.8] 2025        Code status: Full Code       RECOMMENDATIONS:     Respiratory:   Stable respiratory status.  Chest x-ray 2025 is normal  Keep SPO2 >=92%. HOB 30 degree elevation all the time. Aggressive pulmonary toileting. Aspiration precautions. Incentive spirometry.    CVS:   Mildly elevated blood pressure.  As needed metoprolol.  Monitor EKG.  Troponin is normal.  No chest pain.    ID:   Patient 3 weeks ago had exposure to chemicals after that he had facial rash.  Not clear etiology if it was infectious viral?  He had a virtual visit and he was assumed to have shingles however his lesions were only on his face he did not have any lesions on his chest or back.  The rash is currently healing no open lesions.  No fever no chills.  No vision problems.  If any signs of infection he will help get Keflex but there is no need for antiviral medications.  No need for steroids.  Patient received steroids for possible post shingles rash?.  Develop DKA    Hematology/Oncology: Hemoglobin elevated due to dehydration continue hydration    Renal:   CHERYL.  Dehydration  Hyponatremia related to hyperglycemia replace electrolytes.  Monitor closely bicarbonate are decreased

## 2025-04-21 NOTE — ED NOTES
TRANSFER - OUT REPORT:    Verbal report given to ICU Nurse on Carlos Jiménez  being transferred to ICU for routine progression of patient care       Report consisted of patient's Situation, Background, Assessment and   Recommendations(SBAR).     Information from the following report(s) Nurse Handoff Report, ED SBAR, MAR, and Recent Results was reviewed with the receiving nurse.    [place] Fall Assessment:    Presents to emergency department  because of falls (Syncope, seizure, or loss of consciousness): No  Age > 70: No  Altered Mental Status, Intoxication with alcohol or substance confusion (Disorientation, impaired judgment, poor safety awaremess, or inability to follow instructions): No  Impaired Mobility: Ambulates or transfers with assistive devices or assistance; Unable to ambulate or transer.: No  Nursing Judgement: No          Lines:   Peripheral IV 04/21/25 Left Antecubital (Active)   Site Assessment Clean, dry & intact 04/21/25 1018   Line Status Blood return noted;Flushed;Normal saline locked 04/21/25 1018        Opportunity for questions and clarification was provided.      Patient transported with:  Monitor

## 2025-04-21 NOTE — H&P
History & Physical      Patient: Carlos Jiménez MRN: [id number]  CSN: [id number]    YOB: 1975  Age: 49 y.o.  Sex: male      DOA: 4/21/2025  Chief Complaint:   Chief Complaint   Patient presents with    Abdominal Pain       Active Hospital Problems    Diagnosis Date Noted    DKA (diabetic ketoacidosis) (HCC) [E11.10] 04/21/2025    DKA, type 1, not at goal (HCC) [E10.10] 04/21/2025    Shingles rash [B02.9] 04/21/2025    Metabolic acidosis [E87.20] 04/21/2025    Electrolyte imbalance [E87.8] 04/21/2025          Assessment & Plan    # Diabetic Ketoacidosis;  DM type 2   - pt was given steroids 4/13 recently for ?shingles Vs other rash.  At home, takes Lantus for past year.  CXR (-), trop (-). Wbc 8, UA no pyuria   - Admitted to ICU on DKA protocol.     - hourly glucose checks, q4 hr BMPs   - replete lytes; goal K~4, Mg~2;   telemetry   - once gap closes, start lantus w/ 2 hour overlap of drip prior to weaning     # Nonoliguric CHERYL, prerenal   - continue aggressive IV fluids; monitor UOP   - avoid nephrotoxins; renally dose meds    # Epigastric pain- in setting of DKA, vomiting.  Lipase normal.   TB & Alk minimally elev.   - pain control, DKA tx   - monitor LFTs    # Pseudohyponatremia- corrected is normal. Monitor    # Polycythemia- contracted    # Recent facial rash, right forehead-crusted lesions. Unclear if this was shingles- was given steroids by urgent care as virtual visit..  No ophthalmic involvement, and lesions are now crusted.       Code Status:  Full code.      POA/Proxy:   Girlfriend Flaco Cabezas   [phone]    DVT Prophylaxis:  [x]Lovenox  []Hep SQ   [] Eliquis, Xarelto   []Coumadin  []Heparin Drip   []SCDs   Case discussed with:  [x]Patient  []Family [] Consultants  []Nursing  []Case Management  Expect the patient needing more than 2 midnights stay Yes [x]  No []        History of Present Illness:      Carlos Jiménez is a 49 y.o. male w/ Hx DM type (dx'd 10 yrs ago) and now on

## 2025-04-22 LAB
ALBUMIN SERPL-MCNC: 2.8 G/DL (ref 3.4–5)
ALBUMIN SERPL-MCNC: 2.9 G/DL (ref 3.4–5)
ALBUMIN/GLOB SERPL: 0.9 (ref 0.8–1.7)
ALBUMIN/GLOB SERPL: 0.9 (ref 0.8–1.7)
ALP SERPL-CCNC: 80 U/L (ref 45–117)
ALP SERPL-CCNC: 80 U/L (ref 45–117)
ALT SERPL-CCNC: 31 U/L (ref 16–61)
ALT SERPL-CCNC: 32 U/L (ref 16–61)
ANION GAP SERPL CALC-SCNC: 7 MMOL/L (ref 3–18)
ANION GAP SERPL CALC-SCNC: 7 MMOL/L (ref 3–18)
ANION GAP SERPL CALC-SCNC: 8 MMOL/L (ref 3–18)
ANION GAP SERPL CALC-SCNC: 8 MMOL/L (ref 3–18)
ANION GAP SERPL CALC-SCNC: 9 MMOL/L (ref 3–18)
ANION GAP SERPL CALC-SCNC: 9 MMOL/L (ref 3–18)
AST SERPL-CCNC: 14 U/L (ref 10–38)
AST SERPL-CCNC: 15 U/L (ref 10–38)
BILIRUB DIRECT SERPL-MCNC: 0.4 MG/DL (ref 0–0.2)
BILIRUB SERPL-MCNC: 1 MG/DL (ref 0.2–1)
BILIRUB SERPL-MCNC: 1.2 MG/DL (ref 0.2–1)
BUN SERPL-MCNC: 10 MG/DL (ref 7–18)
BUN SERPL-MCNC: 10 MG/DL (ref 7–18)
BUN SERPL-MCNC: 12 MG/DL (ref 7–18)
BUN SERPL-MCNC: 13 MG/DL (ref 7–18)
BUN SERPL-MCNC: 9 MG/DL (ref 7–18)
BUN SERPL-MCNC: 9 MG/DL (ref 7–18)
BUN/CREAT SERPL: 10 (ref 12–20)
BUN/CREAT SERPL: 11 (ref 12–20)
BUN/CREAT SERPL: 11 (ref 12–20)
BUN/CREAT SERPL: 13 (ref 12–20)
BUN/CREAT SERPL: 7 (ref 12–20)
BUN/CREAT SERPL: 9 (ref 12–20)
CALCIUM SERPL-MCNC: 6.5 MG/DL (ref 8.5–10.1)
CALCIUM SERPL-MCNC: 7.6 MG/DL (ref 8.5–10.1)
CALCIUM SERPL-MCNC: 7.8 MG/DL (ref 8.5–10.1)
CALCIUM SERPL-MCNC: 8.2 MG/DL (ref 8.5–10.1)
CALCIUM SERPL-MCNC: 8.3 MG/DL (ref 8.5–10.1)
CALCIUM SERPL-MCNC: 8.7 MG/DL (ref 8.5–10.1)
CHLORIDE SERPL-SCNC: 103 MMOL/L (ref 100–111)
CHLORIDE SERPL-SCNC: 104 MMOL/L (ref 100–111)
CHLORIDE SERPL-SCNC: 105 MMOL/L (ref 100–111)
CHLORIDE SERPL-SCNC: 106 MMOL/L (ref 100–111)
CHLORIDE SERPL-SCNC: 106 MMOL/L (ref 100–111)
CHLORIDE SERPL-SCNC: 115 MMOL/L (ref 100–111)
CO2 SERPL-SCNC: 16 MMOL/L (ref 21–32)
CO2 SERPL-SCNC: 19 MMOL/L (ref 21–32)
CO2 SERPL-SCNC: 21 MMOL/L (ref 21–32)
CO2 SERPL-SCNC: 21 MMOL/L (ref 21–32)
CO2 SERPL-SCNC: 22 MMOL/L (ref 21–32)
CO2 SERPL-SCNC: 24 MMOL/L (ref 21–32)
CREAT SERPL-MCNC: 0.68 MG/DL (ref 0.6–1.3)
CREAT SERPL-MCNC: 1.01 MG/DL (ref 0.6–1.3)
CREAT SERPL-MCNC: 1.02 MG/DL (ref 0.6–1.3)
CREAT SERPL-MCNC: 1.1 MG/DL (ref 0.6–1.3)
CREAT SERPL-MCNC: 1.14 MG/DL (ref 0.6–1.3)
CREAT SERPL-MCNC: 1.34 MG/DL (ref 0.6–1.3)
ERYTHROCYTE [DISTWIDTH] IN BLOOD BY AUTOMATED COUNT: 11.6 % (ref 11.6–14.5)
EST. AVERAGE GLUCOSE BLD GHB EST-MCNC: 232 MG/DL
GLOBULIN SER CALC-MCNC: 3.1 G/DL (ref 2–4)
GLOBULIN SER CALC-MCNC: 3.2 G/DL (ref 2–4)
GLUCOSE BLD STRIP.AUTO-MCNC: 161 MG/DL (ref 70–110)
GLUCOSE BLD STRIP.AUTO-MCNC: 182 MG/DL (ref 70–110)
GLUCOSE BLD STRIP.AUTO-MCNC: 187 MG/DL (ref 70–110)
GLUCOSE BLD STRIP.AUTO-MCNC: 191 MG/DL (ref 70–110)
GLUCOSE BLD STRIP.AUTO-MCNC: 193 MG/DL (ref 70–110)
GLUCOSE BLD STRIP.AUTO-MCNC: 194 MG/DL (ref 70–110)
GLUCOSE BLD STRIP.AUTO-MCNC: 195 MG/DL (ref 70–110)
GLUCOSE BLD STRIP.AUTO-MCNC: 198 MG/DL (ref 70–110)
GLUCOSE BLD STRIP.AUTO-MCNC: 199 MG/DL (ref 70–110)
GLUCOSE BLD STRIP.AUTO-MCNC: 218 MG/DL (ref 70–110)
GLUCOSE BLD STRIP.AUTO-MCNC: 238 MG/DL (ref 70–110)
GLUCOSE BLD STRIP.AUTO-MCNC: 260 MG/DL (ref 70–110)
GLUCOSE BLD STRIP.AUTO-MCNC: 307 MG/DL (ref 70–110)
GLUCOSE BLD STRIP.AUTO-MCNC: 322 MG/DL (ref 70–110)
GLUCOSE SERPL-MCNC: 196 MG/DL (ref 74–99)
GLUCOSE SERPL-MCNC: 226 MG/DL (ref 74–99)
GLUCOSE SERPL-MCNC: 228 MG/DL (ref 74–99)
GLUCOSE SERPL-MCNC: 238 MG/DL (ref 74–99)
GLUCOSE SERPL-MCNC: 247 MG/DL (ref 74–99)
GLUCOSE SERPL-MCNC: 338 MG/DL (ref 74–99)
HBA1C MFR BLD: 9.7 % (ref 4.2–5.6)
HCT VFR BLD AUTO: 41.5 % (ref 36–48)
HGB BLD-MCNC: 14.2 G/DL (ref 13–16)
MAGNESIUM SERPL-MCNC: 1.2 MG/DL (ref 1.6–2.6)
MAGNESIUM SERPL-MCNC: 1.6 MG/DL (ref 1.6–2.6)
MAGNESIUM SERPL-MCNC: 1.8 MG/DL (ref 1.6–2.6)
MAGNESIUM SERPL-MCNC: 1.8 MG/DL (ref 1.6–2.6)
MAGNESIUM SERPL-MCNC: 2.2 MG/DL (ref 1.6–2.6)
MAGNESIUM SERPL-MCNC: 2.3 MG/DL (ref 1.6–2.6)
MCH RBC QN AUTO: 32.3 PG (ref 24–34)
MCHC RBC AUTO-ENTMCNC: 34.2 G/DL (ref 31–37)
MCV RBC AUTO: 94.3 FL (ref 78–100)
NRBC # BLD: 0 K/UL (ref 0–0.01)
NRBC BLD-RTO: 0 PER 100 WBC
PHOSPHATE SERPL-MCNC: 1.9 MG/DL (ref 2.5–4.9)
PHOSPHATE SERPL-MCNC: 2.1 MG/DL (ref 2.5–4.9)
PHOSPHATE SERPL-MCNC: 2.3 MG/DL (ref 2.5–4.9)
PHOSPHATE SERPL-MCNC: 2.6 MG/DL (ref 2.5–4.9)
PHOSPHATE SERPL-MCNC: 2.6 MG/DL (ref 2.5–4.9)
PHOSPHATE SERPL-MCNC: 2.8 MG/DL (ref 2.5–4.9)
PLATELET # BLD AUTO: 200 K/UL (ref 135–420)
PMV BLD AUTO: 9 FL (ref 9.2–11.8)
POTASSIUM SERPL-SCNC: 3.1 MMOL/L (ref 3.5–5.5)
POTASSIUM SERPL-SCNC: 3.8 MMOL/L (ref 3.5–5.5)
POTASSIUM SERPL-SCNC: 3.9 MMOL/L (ref 3.5–5.5)
POTASSIUM SERPL-SCNC: 4 MMOL/L (ref 3.5–5.5)
POTASSIUM SERPL-SCNC: 4.1 MMOL/L (ref 3.5–5.5)
POTASSIUM SERPL-SCNC: 4.1 MMOL/L (ref 3.5–5.5)
PROT SERPL-MCNC: 6 G/DL (ref 6.4–8.2)
PROT SERPL-MCNC: 6 G/DL (ref 6.4–8.2)
RBC # BLD AUTO: 4.4 M/UL (ref 4.35–5.65)
SODIUM SERPL-SCNC: 134 MMOL/L (ref 136–145)
SODIUM SERPL-SCNC: 135 MMOL/L (ref 136–145)
SODIUM SERPL-SCNC: 135 MMOL/L (ref 136–145)
SODIUM SERPL-SCNC: 138 MMOL/L (ref 136–145)
WBC # BLD AUTO: 6.4 K/UL (ref 4.6–13.2)

## 2025-04-22 PROCEDURE — 2500000003 HC RX 250 WO HCPCS: Performed by: INTERNAL MEDICINE

## 2025-04-22 PROCEDURE — 83735 ASSAY OF MAGNESIUM: CPT

## 2025-04-22 PROCEDURE — 2500000003 HC RX 250 WO HCPCS: Performed by: HOSPITALIST

## 2025-04-22 PROCEDURE — 6360000002 HC RX W HCPCS: Performed by: HOSPITALIST

## 2025-04-22 PROCEDURE — 80053 COMPREHEN METABOLIC PANEL: CPT

## 2025-04-22 PROCEDURE — 80048 BASIC METABOLIC PNL TOTAL CA: CPT

## 2025-04-22 PROCEDURE — 84100 ASSAY OF PHOSPHORUS: CPT

## 2025-04-22 PROCEDURE — 85027 COMPLETE CBC AUTOMATED: CPT

## 2025-04-22 PROCEDURE — 1100000003 HC PRIVATE W/ TELEMETRY

## 2025-04-22 PROCEDURE — 6360000002 HC RX W HCPCS: Performed by: INTERNAL MEDICINE

## 2025-04-22 PROCEDURE — 6370000000 HC RX 637 (ALT 250 FOR IP): Performed by: HOSPITALIST

## 2025-04-22 PROCEDURE — 83036 HEMOGLOBIN GLYCOSYLATED A1C: CPT

## 2025-04-22 PROCEDURE — 2580000003 HC RX 258: Performed by: INTERNAL MEDICINE

## 2025-04-22 PROCEDURE — 6370000000 HC RX 637 (ALT 250 FOR IP): Performed by: PHYSICIAN ASSISTANT

## 2025-04-22 PROCEDURE — 36415 COLL VENOUS BLD VENIPUNCTURE: CPT

## 2025-04-22 PROCEDURE — 82962 GLUCOSE BLOOD TEST: CPT

## 2025-04-22 PROCEDURE — 6370000000 HC RX 637 (ALT 250 FOR IP): Performed by: INTERNAL MEDICINE

## 2025-04-22 PROCEDURE — 80076 HEPATIC FUNCTION PANEL: CPT

## 2025-04-22 PROCEDURE — 2580000003 HC RX 258: Performed by: PHYSICIAN ASSISTANT

## 2025-04-22 RX ORDER — CALCIUM GLUCONATE 20 MG/ML
2000 INJECTION, SOLUTION INTRAVENOUS ONCE
Status: COMPLETED | OUTPATIENT
Start: 2025-04-22 | End: 2025-04-22

## 2025-04-22 RX ORDER — INSULIN GLARGINE 100 [IU]/ML
12 INJECTION, SOLUTION SUBCUTANEOUS DAILY
Status: DISCONTINUED | OUTPATIENT
Start: 2025-04-22 | End: 2025-04-22

## 2025-04-22 RX ORDER — DEXTROSE MONOHYDRATE 100 MG/ML
INJECTION, SOLUTION INTRAVENOUS CONTINUOUS PRN
Status: DISCONTINUED | OUTPATIENT
Start: 2025-04-22 | End: 2025-04-23 | Stop reason: HOSPADM

## 2025-04-22 RX ORDER — INSULIN LISPRO 100 [IU]/ML
0-4 INJECTION, SOLUTION INTRAVENOUS; SUBCUTANEOUS
Status: DISCONTINUED | OUTPATIENT
Start: 2025-04-22 | End: 2025-04-22

## 2025-04-22 RX ORDER — INSULIN LISPRO 100 [IU]/ML
0-16 INJECTION, SOLUTION INTRAVENOUS; SUBCUTANEOUS
Status: DISCONTINUED | OUTPATIENT
Start: 2025-04-22 | End: 2025-04-23 | Stop reason: HOSPADM

## 2025-04-22 RX ORDER — SODIUM CHLORIDE 9 MG/ML
INJECTION, SOLUTION INTRAVENOUS CONTINUOUS
Status: DISPENSED | OUTPATIENT
Start: 2025-04-22 | End: 2025-04-23

## 2025-04-22 RX ORDER — INSULIN GLARGINE 100 [IU]/ML
13 INJECTION, SOLUTION SUBCUTANEOUS DAILY
Status: DISCONTINUED | OUTPATIENT
Start: 2025-04-23 | End: 2025-04-22

## 2025-04-22 RX ORDER — GLUCAGON 1 MG/ML
1 KIT INJECTION PRN
Status: DISCONTINUED | OUTPATIENT
Start: 2025-04-22 | End: 2025-04-23 | Stop reason: HOSPADM

## 2025-04-22 RX ORDER — INSULIN GLARGINE 100 [IU]/ML
10 INJECTION, SOLUTION SUBCUTANEOUS DAILY
Status: DISCONTINUED | OUTPATIENT
Start: 2025-04-22 | End: 2025-04-22

## 2025-04-22 RX ORDER — INSULIN GLARGINE 100 [IU]/ML
12 INJECTION, SOLUTION SUBCUTANEOUS DAILY
Status: DISCONTINUED | OUTPATIENT
Start: 2025-04-23 | End: 2025-04-23 | Stop reason: HOSPADM

## 2025-04-22 RX ADMIN — MAGNESIUM SULFATE HEPTAHYDRATE 2000 MG: 40 INJECTION, SOLUTION INTRAVENOUS at 13:33

## 2025-04-22 RX ADMIN — ENOXAPARIN SODIUM 40 MG: 100 INJECTION SUBCUTANEOUS at 14:24

## 2025-04-22 RX ADMIN — POTASSIUM PHOSPHATE, MONOBASIC AND POTASSIUM PHOSPHATE, DIBASIC 15 MMOL: 224; 236 INJECTION, SOLUTION, CONCENTRATE INTRAVENOUS at 03:36

## 2025-04-22 RX ADMIN — SODIUM CHLORIDE: 9 INJECTION, SOLUTION INTRAVENOUS at 09:36

## 2025-04-22 RX ADMIN — INSULIN LISPRO 4 UNITS: 100 INJECTION, SOLUTION INTRAVENOUS; SUBCUTANEOUS at 11:43

## 2025-04-22 RX ADMIN — POTASSIUM CHLORIDE, DEXTROSE MONOHYDRATE AND SODIUM CHLORIDE: 150; 5; 450 INJECTION, SOLUTION INTRAVENOUS at 05:05

## 2025-04-22 RX ADMIN — SODIUM CHLORIDE: 9 INJECTION, SOLUTION INTRAVENOUS at 14:44

## 2025-04-22 RX ADMIN — INSULIN LISPRO 4 UNITS: 100 INJECTION, SOLUTION INTRAVENOUS; SUBCUTANEOUS at 17:05

## 2025-04-22 RX ADMIN — INSULIN GLARGINE 12 UNITS: 100 INJECTION, SOLUTION SUBCUTANEOUS at 07:29

## 2025-04-22 RX ADMIN — INSULIN LISPRO 4 UNITS: 100 INJECTION, SOLUTION INTRAVENOUS; SUBCUTANEOUS at 09:31

## 2025-04-22 RX ADMIN — CALCIUM GLUCONATE 2000 MG: 20 INJECTION, SOLUTION INTRAVENOUS at 05:42

## 2025-04-22 RX ADMIN — INSULIN LISPRO 12 UNITS: 100 INJECTION, SOLUTION INTRAVENOUS; SUBCUTANEOUS at 21:09

## 2025-04-22 ASSESSMENT — PAIN SCALES - GENERAL
PAINLEVEL_OUTOF10: 0

## 2025-04-22 NOTE — DISCHARGE SUMMARY
DISCHARGE SUMMARY  [facility]      Patient: Carlos Jiménez               Sex: male          Admitted: 4/21/2025      Discharge Date:  4/23/2025    YOB: 1975      Age:  49 y.o.        LOS:  LOS: 2 days                Admit Date: 4/21/2025    Discharge Date:  4/23/2025    Admission Diagnoses: CHERYL (acute kidney injury) [N17.9]  DKA, type 1, not at goal (HCC) [E10.10]  Herpes zoster without complication [B02.9]  Diabetic ketoacidosis without coma associated with type 1 diabetes mellitus [E10.10]    Discharge Diagnoses:    Hospital Problems           Last Modified POA    * (Principal) DKA, Type 1 4/22/2025 Yes    T1DM (Chronic) 4/22/2025 Yes    DKA, type 1, not at goal (HCC) 4/21/2025 Yes    Shingles rash 4/21/2025 Yes    Metabolic acidosis 4/21/2025 Yes    Electrolyte imbalance 4/21/2025 Yes        Discharge Condition:  Improved, Stable      HOSPITAL COURSE:      Carlos Jiménez is a 49 y.o. male w/ Hx DM type (dx'd 10 yrs ago) and now on Lantus daily, who presented to ED 4/21 with N/V and epigastric pain for 5 days.  Reports having formed a painful open-lesion rash on his right forehead, for which he had urgent care virtual visit on 4/13 and was Rx'd Medrol dose pack.   Since that time, rash has improved with much less pain, but he has felt progressively worse. He is unable to keep food down, and his abdominal pain has worsened in severity, leaving him in tears.  Denies any other infectious symptoms, chest pain, fevers/chills, diarrhea.      In the ED, found to have DKA with glucose 274, CO2 12, AG 21 and CHERYL Cr 1.59.  CXR (-), trop (-). Wbc 8, UA no pyuria.  Started on Insulin gtt and was admitted to ICU on DKA protocol. He did well overnight, and DKA resolved.  Titrated over to Lantus once gap closed, and weaned off drip 2 hours later, and remained stable.  Downgraded to medical floor.  Lantus was changed to 12u /d.  Documented glucose overnight of 45 was in error- per nursing, and on

## 2025-04-22 NOTE — PROGRESS NOTES
[facility]  Pulmonary, Critical Care, and Sleep Medicine    Name: Carlos Jiménez MRN: [id number]   : 1975 Hospital: [facility]    Date: 2025  Room: [address] [place] [address] Note                                              Consult requesting physician: Dr. Enriquez  Reason for Consult: Metabolic acidosis, DKA, electrolyte imbalance    IMPRESSION:   Metabolic acidosis  DKA  Dehydration electrolyte imbalance  Facial rash  Recent steroid use.        Active Hospital Problems    Diagnosis Date Noted    T1DM [E11.9]      Priority: Medium    DKA, Type 1 [E11.10] 2025    DKA, type 1, not at goal (HCC) [E10.10] 2025    Shingles rash [B02.9] 2025    Metabolic acidosis [E87.20] 2025    Electrolyte imbalance [E87.8] 2025        Code status: Full Code       RECOMMENDATIONS:     Respiratory:   Stable respiratory status.  Chest x-ray 2025 is normal  Keep SPO2 >=92%. HOB 30 degree elevation all the time. Aggressive pulmonary toileting. Aspiration precautions. Incentive spirometry.    CVS:   Mildly elevated blood pressure.  As needed metoprolol.  Monitor EKG.  Troponin is normal.  No chest pain.    ID:   WBC normal  No fever  Patient 3 weeks ago had exposure to chemicals after that he had facial rash.  Not clear etiology if it was infectious viral?  He had a virtual visit and he was assumed to have shingles however his lesions were only on his face he did not have any lesions on his chest or back.  The rash is currently healing no open lesions.  No fever no chills.  No vision problems.  If any signs of infection he will help get Keflex but there is no need for antiviral medications.  No need for steroids.  Patient received steroids for possible post shingles rash?.  Develop DKA    Hematology/Oncology:  Hb normal after hydration   Hemoglobin elevated due to dehydration continue hydration    Renal:   CHERYL resolved cr 1.1  Dehydration  Hyponatremia related to

## 2025-04-22 NOTE — PROGRESS NOTES
Hospitalist Progress Note      Patient name: Carlos Jiménez.  MRN: [id number]          PCP: Tomi Molina, SADIA - NP     Summary:          Carlos Jiménez is a 49 y.o. male w/ Hx DM type (dx'd 10 yrs ago) and now on Lantus daily, who presented to ED 4/21 with N/V and epigastric pain for 5 days.  Reports having formed a painful open-lesion rash on his right forehead, for which he had urgent care virtual visit on 4/13 and was Rx'd Medrol dose pack.   Since that time, rash has improved with much less pain, but he has felt progressively worse. He is unable to keep food down, and his abdominal pain has worsened in severity, leaving him in tears.  Denies any other infectious symptoms, chest pain, fevers/chills, diarrhea.      In the ED, found to have DKA with glucose 274, CO2 12, AG 21 and CHERYL Cr 1.59. Started on Insulin gtt and admitted to ICU.     Assessment/Plan:  Medical Complexity: High-1 acute medical problem with high risk threat, at least 3 follow up items    # Diabetic Ketoacidosis;  DM type 2   - pt was given steroids 4/13 recently for ?shingles Vs other rash.  At home, takes Lantus for past year.  CXR (-), trop (-). Wbc 8, UA no pyuria   - Admitted to ICU on DKA protocol.     - hourly glucose checks, q4 hr BMPs   - replete lytes; goal K~4, Mg~2;   telemetry   - once gap closes, start lantus w/ 2 hour overlap of drip prior to weaning     # Nonoliguric CHERYL, prerenal   - continue aggressive IV fluids; monitor UOP   - avoid nephrotoxins; renally dose meds    # Epigastric pain- in setting of DKA, vomiting.  Lipase normal.   TB & Alk minimally elev.   - pain control, DKA tx   - monitor LFTs    # Pseudohyponatremia- corrected is normal. Monitor     # Polycythemia- contracted     # Recent facial rash, right forehead-crusted lesions. Unclear if this was shingles- was given steroids by urgent care as virtual visit..  No ophthalmic involvement, and lesions are now crusted.        VTE Ppx:  [x]Lovenox  []Heparin SQ

## 2025-04-22 NOTE — PROGRESS NOTES
met patient and girlfriend at bedside for an initial visit.    Patient said he came to the hospital because he was having complications from diabetes. He said he has not experienced these complications before. Patient and girlfriend thanked  for the visit.     provided presence and support for patient and girlfriend.    Chaplains will provided follow-up care for patient and family as needed.    Spiritual Health History and Assessment/Progress Note  [facility]    Spiritual/Emotional Needs,  ,  ,      Name: Carlos Jiménez MRN: [id number]    Age: 49 y.o.     Sex: male   Language: English   Mandaeism: Church   DKA (diabetic ketoacidosis) ([facility])     Date: 4/22/2025            Total Time Calculated: 10 min              Spiritual Assessment began in [facility] INTENSIVE CARE            Encounter Overview/Reason: Spiritual/Emotional Needs  Service Provided For: Patient    Georgie, Belief, Meaning:   Patient identifies as spiritual, is connected with a georgie tradition or spiritual practice, and has beliefs or practices that help with coping during difficult times  Family/Friends Other: Unable to assess.      Importance and Influence:  Patient has spiritual/personal beliefs that influence decisions regarding their health  Family/Friends Other: Unable to assess.    Community:  Patient feels well-supported. Support system includes: Spouse/Partner  Family/Friends feel well-supported. Support system includes: Spouse/Partner    Assessment and Plan of Care:     Patient Interventions include: Facilitated expression of thoughts and feelings, Explored spiritual coping/struggle/distress, and Affirmed coping skills/support systems  Family/Friends Interventions include: Facilitated expression of thoughts and feelings    Patient Plan of Care: No spiritual needs identified for follow-up  Family/Friends Plan of Care: No spiritual needs identified for follow-up    Electronically signed by Ochoa Daniel

## 2025-04-22 NOTE — PROGRESS NOTES
We are waiting for next BMP if normal no metabolic acidosis patient can be transferred to medical with joesph Vargas MD

## 2025-04-22 NOTE — PLAN OF CARE
Problem: ABCDS Injury Assessment  Goal: Absence of physical injury  4/22/2025 0813 by Tosin Ramírez RN  Outcome: Progressing  Flowsheets (Taken 4/22/2025 0800)  Absence of Physical Injury: Implement safety measures based on patient assessment     Problem: Chronic Conditions and Co-morbidities  Goal: Patient's chronic conditions and co-morbidity symptoms are monitored and maintained or improved  4/22/2025 0813 by Tosin Ramírez RN  Outcome: Progressing  Flowsheets (Taken 4/22/2025 0800)  Care Plan - Patient's Chronic Conditions and Co-Morbidity Symptoms are Monitored and Maintained or Improved:   Monitor and assess patient's chronic conditions and comorbid symptoms for stability, deterioration, or improvement   Collaborate with multidisciplinary team to address chronic and comorbid conditions and prevent exacerbation or deterioration   Update acute care plan with appropriate goals if chronic or comorbid symptoms are exacerbated and prevent overall improvement and discharge     Problem: Discharge Planning  Goal: Discharge to home or other facility with appropriate resources  4/22/2025 0813 by Tosin Ramírez RN  Outcome: Progressing  Flowsheets (Taken 4/22/2025 0800)  Discharge to home or other facility with appropriate resources:   Identify barriers to discharge with patient and caregiver   Arrange for needed discharge resources and transportation as appropriate   Identify discharge learning needs (meds, wound care, etc)   Refer to discharge planning if patient needs post-hospital services based on physician order or complex needs related to functional status, cognitive ability or social support system     Problem: Pain  Goal: Verbalizes/displays adequate comfort level or baseline comfort level  4/22/2025 0813 by Tosin Ramírez RN  Outcome: Progressing     Problem: Safety - Adult  Goal: Free from fall injury  4/22/2025 0813 by Tosin Ramírez RN  Outcome:

## 2025-04-22 NOTE — PLAN OF CARE
Problem: Safety - Adult  Goal: Free from fall injury  4/22/2025 1957 by Vicky Olson RN  Outcome: Progressing  Flowsheets (Taken 4/22/2025 1957)  Free From Fall Injury:   Instruct family/caregiver on patient safety   Based on caregiver fall risk screen, instruct family/caregiver to ask for assistance with transferring infant if caregiver noted to have fall risk factors       Problem: Pain  Goal: Verbalizes/displays adequate comfort level or baseline comfort level  4/22/2025 1957 by Vicky Olson RN  Outcome: Progressing  Flowsheets (Taken 4/22/2025 1957)  Verbalizes/displays adequate comfort level or baseline comfort level:   Assess pain using appropriate pain scale   Encourage patient to monitor pain and request assistance   Administer analgesics based on type and severity of pain and evaluate response   Implement non-pharmacological measures as appropriate and evaluate response   Consider cultural and social influences on pain and pain management     Problem: Chronic Conditions and Co-morbidities  Goal: Patient's chronic conditions and co-morbidity symptoms are monitored and maintained or improved  4/22/2025 1957 by Vicky Olson RN  Outcome: Progressing  Flowsheets (Taken 4/22/2025 1957)  Care Plan - Patient's Chronic Conditions and Co-Morbidity Symptoms are Monitored and Maintained or Improved:   Monitor and assess patient's chronic conditions and comorbid symptoms for stability, deterioration, or improvement   Collaborate with multidisciplinary team to address chronic and comorbid conditions and prevent exacerbation or deterioration   Update acute care plan with appropriate goals if chronic or comorbid symptoms are exacerbated and prevent overall improvement and discharge       Problem: ABCDS Injury Assessment  Goal: Absence of physical injury  4/22/2025 1957 by Vicky Olson RN  Outcome: Progressing  Flowsheets (Taken 4/22/2025 0800 by Tosin Ramírez, RN)  Absence of Physical

## 2025-04-22 NOTE — CARE COORDINATION
04/22/25 1008   Discharge Planning   Living Arrangements Spouse/Significant Other   Support Systems Spouse/Significant Other   Current DME Prior to Arrival Other (Comment)  (N/A)   Potential Assistance Needed N/A   Potential Assistance Purchasing Medications No   Potential DME Needed Other (Comment)  (No anticipated DME needs)   Type of Home Care Services None   Patient expects to be discharged to: House       SW met with pt at bedside, introduced self and explained role. Pt accompanied by his girlfriend Flaco. Per the pt the two reside together . Pt reported he was independent prior to admission and worked in [place]. Pt confirmed his PCP as Dr. Molina whom he has appt with on May 1st.     Pt reported he will drive himself home at discharge and in the event he can't his significant other will do so.     Pt has no anticipated CM needs, CM will continue to follow.    KILO Collins  Case Management Department

## 2025-04-22 NOTE — PROGRESS NOTES
8860-9140: bedside shift report rec'd from KARYN Lopez RN and JEREMY Artis RN. Insulin gtt verified per MAR. Rounded with Dr Enriquez, discussed plan of care for the day. Medicated per MAR, given breakfast tray, Pt ate 50-75% and drank supplement.     5681-2239: Assessment per flowsheets. Insulin gtt titrated per MAR.  Rounded with Dr Vargas.    9629-1256: Pt up out of bed to [facility], small BM per flowsheets. Insulin gtt stopped, fluids changed, medicated per MAR. Wife at bedside, no complaints at this time.     0692-1103: lab work re-drawn, Dr Vargas notified of results.    4411-6336: medicated per MAR.  Pt resting in bed with lunch tray, cell phone, remote control, and call bell. Wife at bedside. No complaints at this time.     2293-7190: Pt ate % of lunch tray, urinated per flowsheets.    5066-6578: medicated per MAR.     7263-7517:  Pt resting in bed with cell phone, remote control, and call bell. No complaints at this time.     9544-2455: medicated per MAR.     7882-0055: lab work drawn.  Pt resting in bed with cell phone, remote control, and call bell. No complaints at this time.     4404-3633: medicated per MAR. Pt ate % of dinner tray. Orders rec'd to transfer pt to [address] [place] [address].    1805: attempted to call report, RN will call back.    1816: report called to Cathie CANNON    TRANSFER - OUT REPORT:    Verbal report given to Cathie CANNON on Carlos Jiménez  being transferred to [address] [place] [address] for routine progression of patient care       Report consisted of patient's Situation, Background, Assessment and   Recommendations(SBAR).     Information from the following report(s) Nurse Handoff Report, Index, MAR, Recent Results, Cardiac Rhythm (NSR), and Event Log was reviewed with the receiving nurse.           Lines:   Extended Dwell Peripherial IV 04/21/25 Right Brachial (Active)   Criteria for Appropriate Use Hemodynamically unstable, requiring monitoring lines, vasopressors, or volume resuscitation 04/22/25 0800   Site  severe

## 2025-04-23 VITALS
TEMPERATURE: 97.5 F | HEART RATE: 90 BPM | OXYGEN SATURATION: 100 % | DIASTOLIC BLOOD PRESSURE: 72 MMHG | BODY MASS INDEX: 27.89 KG/M2 | HEIGHT: 72 IN | RESPIRATION RATE: 18 BRPM | SYSTOLIC BLOOD PRESSURE: 105 MMHG | WEIGHT: 205.91 LBS

## 2025-04-23 LAB
ANION GAP SERPL CALC-SCNC: 4 MMOL/L (ref 3–18)
ANION GAP SERPL CALC-SCNC: 6 MMOL/L (ref 3–18)
ANION GAP SERPL CALC-SCNC: 6 MMOL/L (ref 3–18)
ANION GAP SERPL CALC-SCNC: 7 MMOL/L (ref 3–18)
BUN SERPL-MCNC: 10 MG/DL (ref 7–18)
BUN SERPL-MCNC: 11 MG/DL (ref 7–18)
BUN/CREAT SERPL: 10 (ref 12–20)
BUN/CREAT SERPL: 12 (ref 12–20)
BUN/CREAT SERPL: 13 (ref 12–20)
BUN/CREAT SERPL: 13 (ref 12–20)
CA-I SERPL-SCNC: 1.17 MMOL/L (ref 1.12–1.32)
CALCIUM SERPL-MCNC: 8.5 MG/DL (ref 8.5–10.1)
CALCIUM SERPL-MCNC: 8.7 MG/DL (ref 8.5–10.1)
CALCIUM SERPL-MCNC: 8.7 MG/DL (ref 8.5–10.1)
CALCIUM SERPL-MCNC: 8.9 MG/DL (ref 8.5–10.1)
CHLORIDE SERPL-SCNC: 103 MMOL/L (ref 100–111)
CHLORIDE SERPL-SCNC: 104 MMOL/L (ref 100–111)
CHLORIDE SERPL-SCNC: 105 MMOL/L (ref 100–111)
CHLORIDE SERPL-SCNC: 108 MMOL/L (ref 100–111)
CO2 SERPL-SCNC: 23 MMOL/L (ref 21–32)
CO2 SERPL-SCNC: 26 MMOL/L (ref 21–32)
CO2 SERPL-SCNC: 27 MMOL/L (ref 21–32)
CO2 SERPL-SCNC: 28 MMOL/L (ref 21–32)
CREAT SERPL-MCNC: 0.78 MG/DL (ref 0.6–1.3)
CREAT SERPL-MCNC: 0.78 MG/DL (ref 0.6–1.3)
CREAT SERPL-MCNC: 0.93 MG/DL (ref 0.6–1.3)
CREAT SERPL-MCNC: 1.02 MG/DL (ref 0.6–1.3)
EKG ATRIAL RATE: 100 BPM
EKG DIAGNOSIS: NORMAL
EKG P AXIS: 60 DEGREES
EKG P-R INTERVAL: 134 MS
EKG Q-T INTERVAL: 376 MS
EKG QRS DURATION: 88 MS
EKG QTC CALCULATION (BAZETT): 485 MS
EKG R AXIS: 77 DEGREES
EKG T AXIS: 32 DEGREES
EKG VENTRICULAR RATE: 100 BPM
ERYTHROCYTE [DISTWIDTH] IN BLOOD BY AUTOMATED COUNT: 11.7 % (ref 11.6–14.5)
GLUCOSE BLD STRIP.AUTO-MCNC: 129 MG/DL (ref 70–110)
GLUCOSE BLD STRIP.AUTO-MCNC: 218 MG/DL (ref 70–110)
GLUCOSE SERPL-MCNC: 199 MG/DL (ref 74–99)
GLUCOSE SERPL-MCNC: 256 MG/DL (ref 74–99)
GLUCOSE SERPL-MCNC: 303 MG/DL (ref 74–99)
GLUCOSE SERPL-MCNC: 45 MG/DL (ref 74–99)
HCT VFR BLD AUTO: 40.4 % (ref 36–48)
HGB BLD-MCNC: 13.9 G/DL (ref 13–16)
MAGNESIUM SERPL-MCNC: 1.9 MG/DL (ref 1.6–2.6)
MAGNESIUM SERPL-MCNC: 2 MG/DL (ref 1.6–2.6)
MAGNESIUM SERPL-MCNC: 2 MG/DL (ref 1.6–2.6)
MAGNESIUM SERPL-MCNC: 2.2 MG/DL (ref 1.6–2.6)
MCH RBC QN AUTO: 32.3 PG (ref 24–34)
MCHC RBC AUTO-ENTMCNC: 34.4 G/DL (ref 31–37)
MCV RBC AUTO: 93.7 FL (ref 78–100)
NRBC # BLD: 0 K/UL (ref 0–0.01)
NRBC BLD-RTO: 0 PER 100 WBC
PHOSPHATE SERPL-MCNC: 2.2 MG/DL (ref 2.5–4.9)
PHOSPHATE SERPL-MCNC: 2.2 MG/DL (ref 2.5–4.9)
PHOSPHATE SERPL-MCNC: 2.3 MG/DL (ref 2.5–4.9)
PHOSPHATE SERPL-MCNC: 3.3 MG/DL (ref 2.5–4.9)
PLATELET # BLD AUTO: 212 K/UL (ref 135–420)
PMV BLD AUTO: 9.4 FL (ref 9.2–11.8)
POTASSIUM SERPL-SCNC: 3.9 MMOL/L (ref 3.5–5.5)
POTASSIUM SERPL-SCNC: 4 MMOL/L (ref 3.5–5.5)
POTASSIUM SERPL-SCNC: 4 MMOL/L (ref 3.5–5.5)
POTASSIUM SERPL-SCNC: 4.3 MMOL/L (ref 3.5–5.5)
RBC # BLD AUTO: 4.31 M/UL (ref 4.35–5.65)
SODIUM SERPL-SCNC: 135 MMOL/L (ref 136–145)
SODIUM SERPL-SCNC: 135 MMOL/L (ref 136–145)
SODIUM SERPL-SCNC: 137 MMOL/L (ref 136–145)
SODIUM SERPL-SCNC: 140 MMOL/L (ref 136–145)
WBC # BLD AUTO: 7.2 K/UL (ref 4.6–13.2)

## 2025-04-23 PROCEDURE — 36415 COLL VENOUS BLD VENIPUNCTURE: CPT

## 2025-04-23 PROCEDURE — 83735 ASSAY OF MAGNESIUM: CPT

## 2025-04-23 PROCEDURE — 84100 ASSAY OF PHOSPHORUS: CPT

## 2025-04-23 PROCEDURE — 6370000000 HC RX 637 (ALT 250 FOR IP): Performed by: INTERNAL MEDICINE

## 2025-04-23 PROCEDURE — 82330 ASSAY OF CALCIUM: CPT

## 2025-04-23 PROCEDURE — 80048 BASIC METABOLIC PNL TOTAL CA: CPT

## 2025-04-23 PROCEDURE — 85027 COMPLETE CBC AUTOMATED: CPT

## 2025-04-23 PROCEDURE — 6370000000 HC RX 637 (ALT 250 FOR IP): Performed by: HOSPITALIST

## 2025-04-23 PROCEDURE — 82962 GLUCOSE BLOOD TEST: CPT

## 2025-04-23 RX ORDER — GLUCOSAMINE HCL/CHONDROITIN SU 500-400 MG
CAPSULE ORAL
Qty: 100 STRIP | Refills: 5
Start: 2025-04-23

## 2025-04-23 RX ADMIN — INSULIN LISPRO 4 UNITS: 100 INJECTION, SOLUTION INTRAVENOUS; SUBCUTANEOUS at 11:46

## 2025-04-23 RX ADMIN — INSULIN GLARGINE 12 UNITS: 100 INJECTION, SOLUTION SUBCUTANEOUS at 10:04

## 2025-04-23 RX ADMIN — INSULIN LISPRO 8 UNITS: 100 INJECTION, SOLUTION INTRAVENOUS; SUBCUTANEOUS at 07:48

## 2025-04-23 ASSESSMENT — PAIN SCALES - GENERAL
PAINLEVEL_OUTOF10: 0
PAINLEVEL_OUTOF10: 0

## 2025-04-23 NOTE — DISCHARGE INSTRUCTIONS
A message from your hospital medicine team & physician,  Dr. Brenna Enriquez D.O.      It was a privilege caring for you while you were hospitalized with us.  As physicians specializing in hospital-based care, it is our goal to provide expert, safe & patient-centered care, and to help you feel prepared and empowered with information to ensure a smooth continuation of your care and recovery at home.  I  hope we have addressed your concerns.  Most of all, I wish you the best in your recovery.     You were admitted to the hospital with  Diabetic ketoacidosis, likely triggered by steroids.         Please review all of your medications carefully when you get home, and compare them with the list you are receiving today.      ** NO DRIVING is permitted while taking any Narcotics,  benzodiazepines, or other sedating medications       Please seek medical care from your primary doctor, or return to the ER if urgent,  if you experience:       Uncontrolled Bleeding  (Or black, red stools, vomiting blood)     Fall and hitting your head  (ESPECIALLY if taking a blood thinner)-> will need CT scan of head in ER     Worsening leg or body edema/fluid, shortness of breath, cough  or QUICK weight gain on your scale (over days/week, with swelling)     New or concerning symptoms such as fevers, chills, chest pain/racing heart, uncontrolled nausea/vomiting or diarrhea, urinary symptoms, dizziness     New neurologic /stroke-like symptoms   (numbness, tingling, lightheaded, severe headache, vision changes, gait imbalance, confusion, new tremor or seizure)      ** PREVENTATIVE:   * ALL of us should stay Up To Date on Vaccinations  (Pneumonia, Flu, Covid, Shingles,  Etc)--  Talk to your Primary doctor (PCP) about this   * LADIES over 65 years old:   Stay up to date on DEXA Bone Density Scans (monitors for osteoporosis);  Pap smears & Breast Exams   * GENTLEMAN:  Ensure up to date on Prostate Exams through your doctor or urologist   *

## 2025-04-23 NOTE — PROGRESS NOTES
Bedside report received on pt, significant other at bedside. He is alert and oriented, he denies any pain and no signs of discomfort noted. Significant other at bedside, call light within pt's reach.    0204: Critical lab glucose of 45 received, POC glucose check 129. Pt alert and oriented X4, pt educated on signs and symptoms of hyperglycemic and hypoglycemic, he verbalized understanding. Call light within pt's reach, will continue to monitor.     Latest Reference Range & Units 04/23/25 01:19 04/23/25 01:59   Glucose 74 - 99 mg/dL 45 (LL)    POC Glucose 70 - 110 mg/dL  129 (H)   (LL): Data is critically low  (H): Data is abnormally high